# Patient Record
Sex: FEMALE | Race: WHITE | NOT HISPANIC OR LATINO | Employment: FULL TIME | URBAN - METROPOLITAN AREA
[De-identification: names, ages, dates, MRNs, and addresses within clinical notes are randomized per-mention and may not be internally consistent; named-entity substitution may affect disease eponyms.]

---

## 2017-12-08 ENCOUNTER — CONVERSION ENCOUNTER (OUTPATIENT)
Dept: MAMMOGRAPHY | Facility: HOSPITAL | Age: 47
End: 2017-12-08

## 2018-12-11 ENCOUNTER — CONVERSION ENCOUNTER (OUTPATIENT)
Dept: OTHER | Facility: HOSPITAL | Age: 48
End: 2018-12-11

## 2019-01-03 ENCOUNTER — HOSPITAL ENCOUNTER (OUTPATIENT)
Dept: OTHER | Facility: HOSPITAL | Age: 49
Discharge: HOME | End: 2019-01-03
Attending: NURSE PRACTITIONER

## 2020-02-14 ENCOUNTER — OFFICE VISIT CONVERTED (OUTPATIENT)
Dept: PODIATRY | Facility: CLINIC | Age: 50
End: 2020-02-14
Attending: PODIATRIST

## 2020-02-28 ENCOUNTER — OFFICE VISIT CONVERTED (OUTPATIENT)
Dept: PODIATRY | Facility: CLINIC | Age: 50
End: 2020-02-28
Attending: PODIATRIST

## 2021-04-26 ENCOUNTER — HOSPITAL ENCOUNTER (OUTPATIENT)
Dept: OTHER | Facility: HOSPITAL | Age: 51
Discharge: HOME | End: 2021-04-26
Attending: FAMILY MEDICINE

## 2021-04-28 ENCOUNTER — HOSPITAL ENCOUNTER (OUTPATIENT)
Dept: OTHER | Facility: HOSPITAL | Age: 51
Discharge: HOME | End: 2021-04-28
Attending: FAMILY MEDICINE

## 2021-05-12 ENCOUNTER — HOSPITAL ENCOUNTER (OUTPATIENT)
Dept: GENERAL RADIOLOGY | Facility: HOSPITAL | Age: 51
Discharge: HOME | End: 2021-05-12
Attending: FAMILY MEDICINE

## 2021-05-15 VITALS
HEART RATE: 75 BPM | BODY MASS INDEX: 36.37 KG/M2 | HEIGHT: 68 IN | DIASTOLIC BLOOD PRESSURE: 76 MMHG | OXYGEN SATURATION: 98 % | SYSTOLIC BLOOD PRESSURE: 125 MMHG | WEIGHT: 240 LBS

## 2021-05-15 VITALS
WEIGHT: 242 LBS | DIASTOLIC BLOOD PRESSURE: 72 MMHG | HEART RATE: 70 BPM | SYSTOLIC BLOOD PRESSURE: 120 MMHG | HEIGHT: 68 IN | BODY MASS INDEX: 36.68 KG/M2 | OXYGEN SATURATION: 97 %

## 2022-01-31 ENCOUNTER — PREP FOR SURGERY (OUTPATIENT)
Dept: OTHER | Facility: HOSPITAL | Age: 52
End: 2022-01-31

## 2022-01-31 ENCOUNTER — OFFICE VISIT (OUTPATIENT)
Dept: SURGERY | Facility: CLINIC | Age: 52
End: 2022-01-31

## 2022-01-31 VITALS — HEIGHT: 68 IN | BODY MASS INDEX: 34.1 KG/M2 | RESPIRATION RATE: 17 BRPM | HEART RATE: 70 BPM | WEIGHT: 225 LBS

## 2022-01-31 DIAGNOSIS — Z12.11 SCREENING FOR MALIGNANT NEOPLASM OF COLON: Primary | ICD-10-CM

## 2022-01-31 PROCEDURE — S0285 CNSLT BEFORE SCREEN COLONOSC: HCPCS | Performed by: NURSE PRACTITIONER

## 2022-01-31 RX ORDER — VENLAFAXINE HYDROCHLORIDE 37.5 MG/1
CAPSULE, EXTENDED RELEASE ORAL
Status: ON HOLD | COMMUNITY
End: 2022-04-11

## 2022-01-31 RX ORDER — POLYETHYLENE GLYCOL 3350 17 G/17G
POWDER, FOR SOLUTION ORAL
Qty: 238 PACKET | Refills: 0 | Status: SHIPPED | OUTPATIENT
Start: 2022-01-31 | End: 2022-04-11 | Stop reason: HOSPADM

## 2022-01-31 RX ORDER — HYDROCODONE BITARTRATE AND ACETAMINOPHEN 5; 325 MG/1; MG/1
1 TABLET ORAL EVERY 6 HOURS PRN
Status: ON HOLD | COMMUNITY
Start: 2021-11-29 | End: 2022-04-11

## 2022-01-31 RX ORDER — SODIUM CHLORIDE 0.9 % (FLUSH) 0.9 %
10 SYRINGE (ML) INJECTION AS NEEDED
Status: CANCELLED | OUTPATIENT
Start: 2022-01-31

## 2022-01-31 RX ORDER — DEXTROAMPHETAMINE SACCHARATE, AMPHETAMINE ASPARTATE, DEXTROAMPHETAMINE SULFATE AND AMPHETAMINE SULFATE 2.5; 2.5; 2.5; 2.5 MG/1; MG/1; MG/1; MG/1
TABLET ORAL
COMMUNITY
Start: 2022-01-20

## 2022-01-31 RX ORDER — LEVOTHYROXINE SODIUM 0.05 MG/1
TABLET ORAL
COMMUNITY

## 2022-01-31 RX ORDER — SODIUM CHLORIDE 0.9 % (FLUSH) 0.9 %
3 SYRINGE (ML) INJECTION EVERY 12 HOURS SCHEDULED
Status: CANCELLED | OUTPATIENT
Start: 2022-01-31

## 2022-01-31 RX ORDER — PHENTERMINE HYDROCHLORIDE 37.5 MG/1
TABLET ORAL
COMMUNITY

## 2022-01-31 NOTE — PROGRESS NOTES
"Chief Complaint: Colonoscopy (consult)    Subjective      Colonoscopy consultation         History of Present Illness  Meme Dunne is a 51 y.o. female presents to [facility] GENERAL SURGERY for a colonoscopy consultation.    Patient presents today on referral from Dr. Danny Pendleton.    Patient presents without complaints for screening colonoscopy.    Patient denies any abdominal pain, change in bowel habit, rectal bleeding.    Denies any family history of colorectal cancer.    Reports last colonoscopy was several years ago and was normal.     Patient has a history of an anal fissurectomy performed by Dr. Pendleton.       Objective     History reviewed. No pertinent past medical history.    Past Surgical History:   Procedure Laterality Date   • ANAL FISSURECTOMY     • APPENDECTOMY     • CHOLECYSTECTOMY     • HYSTERECTOMY  1999       Outpatient Medications Marked as Taking for the 1/31/22 encounter (Office Visit) with Rona Slaughter, MELODIE   Medication Sig Dispense Refill   • amphetamine-dextroamphetamine (ADDERALL) 10 MG tablet          No Known Allergies     Family History   Problem Relation Age of Onset   • Hypertension Father        Social History     Socioeconomic History   • Marital status:    Tobacco Use   • Smoking status: Never Smoker   • Smokeless tobacco: Never Used   Vaping Use   • Vaping Use: Never used   Substance and Sexual Activity   • Alcohol use: Never   • Drug use: Never       Review of Systems   Constitutional: Negative for chills and fever.   Gastrointestinal: Negative for abdominal distention, abdominal pain, anal bleeding, blood in stool, constipation, diarrhea and rectal pain.        Vital Signs:   Pulse 70   Resp 17   Ht 172.7 cm (68\")   Wt 102 kg (225 lb)   BMI 34.21 kg/m²      Physical Exam  Constitutional:       Appearance: Normal appearance.   HENT:      Head: Normocephalic.   Cardiovascular:      Rate and Rhythm: Normal rate.   Pulmonary:      Effort: Pulmonary " effort is normal.   Abdominal:      General: Abdomen is flat.      Palpations: Abdomen is soft.   Skin:     General: Skin is warm and dry.   Neurological:      General: No focal deficit present.      Mental Status: She is alert and oriented to person, place, and time.   Psychiatric:         Mood and Affect: Mood normal.         Thought Content: Thought content normal.          Result Review :              []  Laboratory  []  Radiology  []  Pathology  []  Microbiology  []  EKG/Telemetry   []  Cardiology/Vascular   [x]  Old records  Today I have reviewed reviewed Dr. Lynch's previous office note.     Assessment and Plan    Diagnoses and all orders for this visit:    1. Screening for malignant neoplasm of colon (Primary)    Other orders  -     polyethylene glycol (MIRALAX) 17 g packet; Take as directed.  Instructions given in office.  Dispense: 238 g bottle  Dispense: 238 packet; Refill: 0    orange prep    Follow Up   Return for Schedule colonoscopy with Dr. Luna on 4/11/2022 at [facility].     Hospital arrival time 9:30 AM    Possible risks/complications, benefits, and alternatives to surgical or invasive procedure have been explained to patient and/or legal guardian.     Patient has been evaluated and can tolerate anesthesia and/or sedation. Risks, benefits, and alternatives to anesthesia and sedation have been explained to patient and/or legal guardian.  Patient verbalizes understanding and is willing to proceed with the above plan.    Patient was given instructions and counseling regarding her condition or for health maintenance advice. Please see specific information pulled into the AVS if appropriate.

## 2022-04-11 ENCOUNTER — ANESTHESIA EVENT (OUTPATIENT)
Dept: GASTROENTEROLOGY | Facility: HOSPITAL | Age: 52
End: 2022-04-11

## 2022-04-11 ENCOUNTER — HOSPITAL ENCOUNTER (OUTPATIENT)
Facility: HOSPITAL | Age: 52
Setting detail: HOSPITAL OUTPATIENT SURGERY
Discharge: HOME | End: 2022-04-11
Attending: SURGERY | Admitting: SURGERY

## 2022-04-11 ENCOUNTER — ANESTHESIA (OUTPATIENT)
Dept: GASTROENTEROLOGY | Facility: HOSPITAL | Age: 52
End: 2022-04-11

## 2022-04-11 VITALS
SYSTOLIC BLOOD PRESSURE: 126 MMHG | TEMPERATURE: 97.5 F | RESPIRATION RATE: 14 BRPM | WEIGHT: 222.44 LBS | BODY MASS INDEX: 33.82 KG/M2 | DIASTOLIC BLOOD PRESSURE: 89 MMHG | HEART RATE: 65 BPM | OXYGEN SATURATION: 95 %

## 2022-04-11 DIAGNOSIS — Z12.11 SCREENING FOR MALIGNANT NEOPLASM OF COLON: ICD-10-CM

## 2022-04-11 PROCEDURE — 25010000002 PROPOFOL 10 MG/ML EMULSION: Performed by: NURSE ANESTHETIST, CERTIFIED REGISTERED

## 2022-04-11 RX ORDER — SODIUM CHLORIDE 0.9 % (FLUSH) 0.9 %
3 SYRINGE (ML) INJECTION EVERY 12 HOURS SCHEDULED
Status: DISCONTINUED | OUTPATIENT
Start: 2022-04-11 | End: 2022-04-11 | Stop reason: HOSPADM

## 2022-04-11 RX ORDER — ONDANSETRON 2 MG/ML
4 INJECTION INTRAMUSCULAR; INTRAVENOUS ONCE AS NEEDED
Status: DISCONTINUED | OUTPATIENT
Start: 2022-04-11 | End: 2022-04-11 | Stop reason: HOSPADM

## 2022-04-11 RX ORDER — LIDOCAINE HYDROCHLORIDE 20 MG/ML
INJECTION, SOLUTION INFILTRATION; PERINEURAL AS NEEDED
Status: DISCONTINUED | OUTPATIENT
Start: 2022-04-11 | End: 2022-04-11 | Stop reason: SURG

## 2022-04-11 RX ORDER — SODIUM CHLORIDE 0.9 % (FLUSH) 0.9 %
10 SYRINGE (ML) INJECTION AS NEEDED
Status: DISCONTINUED | OUTPATIENT
Start: 2022-04-11 | End: 2022-04-11 | Stop reason: HOSPADM

## 2022-04-11 RX ORDER — SODIUM CHLORIDE, SODIUM LACTATE, POTASSIUM CHLORIDE, CALCIUM CHLORIDE 600; 310; 30; 20 MG/100ML; MG/100ML; MG/100ML; MG/100ML
30 INJECTION, SOLUTION INTRAVENOUS CONTINUOUS
Status: DISCONTINUED | OUTPATIENT
Start: 2022-04-11 | End: 2022-04-11 | Stop reason: HOSPADM

## 2022-04-11 RX ORDER — ONDANSETRON 4 MG/1
4 TABLET, FILM COATED ORAL ONCE AS NEEDED
Status: DISCONTINUED | OUTPATIENT
Start: 2022-04-11 | End: 2022-04-11 | Stop reason: HOSPADM

## 2022-04-11 RX ORDER — PROPOFOL 10 MG/ML
VIAL (ML) INTRAVENOUS AS NEEDED
Status: DISCONTINUED | OUTPATIENT
Start: 2022-04-11 | End: 2022-04-11 | Stop reason: SURG

## 2022-04-11 RX ADMIN — SODIUM CHLORIDE, POTASSIUM CHLORIDE, SODIUM LACTATE AND CALCIUM CHLORIDE 30 ML/HR: 600; 310; 30; 20 INJECTION, SOLUTION INTRAVENOUS at 10:48

## 2022-04-11 RX ADMIN — PROPOFOL 100 MG: 10 INJECTION, EMULSION INTRAVENOUS at 11:19

## 2022-04-11 RX ADMIN — LIDOCAINE HYDROCHLORIDE 30 MG: 20 INJECTION, SOLUTION INFILTRATION; PERINEURAL at 11:19

## 2022-04-11 RX ADMIN — PROPOFOL 160 MCG/KG/MIN: 10 INJECTION, EMULSION INTRAVENOUS at 11:19

## 2022-04-11 NOTE — ANESTHESIA PREPROCEDURE EVALUATION
Anesthesia Evaluation     Patient summary reviewed and Nursing notes reviewed   no history of anesthetic complications:  NPO Solid Status: > 8 hours  NPO Liquid Status: > 2 hours           Airway   Mallampati: II  TM distance: >3 FB  No difficulty expected  Dental - normal exam     Pulmonary - negative pulmonary ROS and normal exam   Cardiovascular - negative cardio ROS and normal exam  Exercise tolerance: good (4-7 METS)        Neuro/Psych  (+) psychiatric history Depression,    GI/Hepatic/Renal/Endo    (+) obesity,       Musculoskeletal (-) negative ROS    Abdominal  - normal exam   Substance History - negative use     OB/GYN negative ob/gyn ROS         Other - negative ROS                       Anesthesia Plan    ASA 2     general   (Total IV Anesthesia    Patient understands anesthesia not responsible for dental damage.  )  intravenous induction     Anesthetic plan, all risks, benefits, and alternatives have been provided, discussed and informed consent has been obtained with: patient.    Plan discussed with CRNA.        CODE STATUS:

## 2022-04-11 NOTE — ANESTHESIA POSTPROCEDURE EVALUATION
Patient: Meme Dunne    Procedure Summary     Date: 04/11/22 Room / Location: [facility] ENDOSCOPY 3 / [facility] ENDOSCOPY    Anesthesia Start: 1117 Anesthesia Stop: 1137    Procedure: COLONOSCOPY (N/A ) Diagnosis:       Screening for malignant neoplasm of colon      (Screening for malignant neoplasm of colon [Z12.11])    Surgeons: William Luna MD Provider: Mayuri Buenrostro DO    Anesthesia Type: general ASA Status: 2          Anesthesia Type: general    Vitals  Vitals Value Taken Time   /89 04/11/22 1157   Temp 36.4 °C (97.5 °F) 04/11/22 1157   Pulse 65 04/11/22 1157   Resp 14 04/11/22 1157   SpO2 95 % 04/11/22 1157           Post Anesthesia Care and Evaluation    Patient location during evaluation: bedside  Patient participation: complete - patient participated  Level of consciousness: awake  Pain management: adequate  Airway patency: patent  Anesthetic complications: No anesthetic complications  PONV Status: none  Cardiovascular status: acceptable and stable  Respiratory status: acceptable  Hydration status: acceptable    Comments: An Anesthesiologist personally participated in the most demanding procedures (including induction and emergence if applicable) in the anesthesia plan, monitored the course of anesthesia administration at frequent intervals and remained physically present and available for immediate diagnosis and treatment of emergencies.

## 2022-04-11 NOTE — H&P
[facility]   HISTORY AND PHYSICAL    Patient Name: Meme Dunne  : 1970  MRN: [id number]  Primary Care Physician:  Woody Lynch MD  Date of admission: 2022    Subjective   Subjective     Chief Complaint: Colon screening    HPI:    Meme Dunne is a 51 y.o. female who presents for colon screening.    Review of Systems   Respiratory: Negative for shortness of breath.    Cardiovascular: Negative for chest pain.       Personal History     No past medical history on file.    Past Surgical History:   Procedure Laterality Date   • ANAL FISSURECTOMY     • APPENDECTOMY     • CHOLECYSTECTOMY     • HYSTERECTOMY         Family History: family history includes Hypertension in her father. Otherwise pertinent FHx was reviewed and not pertinent to current issue.    Social History:  reports that she has never smoked. She has never used smokeless tobacco. She reports that she does not drink alcohol and does not use drugs.    Home Medications:  HYDROcodone-acetaminophen, amphetamine-dextroamphetamine, levothyroxine, phentermine, polyethylene glycol, and venlafaxine XR    Allergies:  No Known Allergies    Objective    Objective     Vitals:   Temp:  [97.7 °F (36.5 °C)] 97.7 °F (36.5 °C)  Heart Rate:  [65] 65  Resp:  [16] 16  BP: (133)/(89) 133/89    Physical Exam  HENT:      Head: Normocephalic.   Cardiovascular:      Rate and Rhythm: Normal rate.   Pulmonary:      Effort: Pulmonary effort is normal.   Abdominal:      Palpations: Abdomen is soft.   Musculoskeletal:         General: Normal range of motion.      Cervical back: Normal range of motion.   Skin:     General: Skin is warm.   Neurological:      General: No focal deficit present.      Mental Status: She is alert.   Psychiatric:         Mood and Affect: Mood normal.         Result Review    Result Review:  I have personally reviewed the results from the time of this admission to 2022 10:27 EDT and agree with these findings:  []   Laboratory  []  Microbiology  []  Radiology  []  EKG/Telemetry   []  Cardiology/Vascular   []  Pathology  []  Old records  []  Other:  Most notable findings include:     Assessment/Plan   Assessment / Plan     Brief Patient Summary:  Meme Dunne is a 51 y.o. female who presents for colon screening    Active Hospital Problems:  Active Hospital Problems    Diagnosis    • **Screening for malignant neoplasm of colon      Added automatically from request for surgery [id number]         Plan:   We will proceed with a colonoscopy.  Risk benefits and alternatives were explained.    DVT prophylaxis:  No DVT prophylaxis order currently exists.    CODE STATUS:         Admission Status:  I believe this patient meets outpatient status.    Electronically signed by William Luna MD, 04/11/22, 10:27 AM EDT.

## 2023-09-29 ENCOUNTER — TRANSCRIBE ORDERS (OUTPATIENT)
Dept: ADMINISTRATIVE | Facility: HOSPITAL | Age: 53
End: 2023-09-29
Payer: COMMERCIAL

## 2023-09-29 DIAGNOSIS — M79.601 RIGHT ARM PAIN: ICD-10-CM

## 2023-09-29 DIAGNOSIS — Z12.31 VISIT FOR SCREENING MAMMOGRAM: Primary | ICD-10-CM

## 2023-10-20 ENCOUNTER — HOSPITAL ENCOUNTER (OUTPATIENT)
Dept: MRI IMAGING | Facility: HOSPITAL | Age: 53
Discharge: HOME | End: 2023-10-20
Admitting: NURSE PRACTITIONER
Payer: COMMERCIAL

## 2023-10-20 DIAGNOSIS — M79.601 RIGHT ARM PAIN: ICD-10-CM

## 2023-10-20 PROCEDURE — 72141 MRI NECK SPINE W/O DYE: CPT

## 2023-11-08 ENCOUNTER — HOSPITAL ENCOUNTER (OUTPATIENT)
Dept: MAMMOGRAPHY | Facility: HOSPITAL | Age: 53
Discharge: HOME | End: 2023-11-08
Admitting: NURSE PRACTITIONER
Payer: COMMERCIAL

## 2023-11-08 DIAGNOSIS — Z12.31 VISIT FOR SCREENING MAMMOGRAM: ICD-10-CM

## 2023-11-08 PROCEDURE — 77063 BREAST TOMOSYNTHESIS BI: CPT

## 2023-11-08 PROCEDURE — 77067 SCR MAMMO BI INCL CAD: CPT

## 2023-11-14 ENCOUNTER — OFFICE VISIT (OUTPATIENT)
Dept: NEUROSURGERY | Facility: CLINIC | Age: 53
End: 2023-11-14
Payer: COMMERCIAL

## 2023-11-14 VITALS
HEART RATE: 84 BPM | WEIGHT: 220 LBS | DIASTOLIC BLOOD PRESSURE: 94 MMHG | SYSTOLIC BLOOD PRESSURE: 137 MMHG | HEIGHT: 68 IN | BODY MASS INDEX: 33.34 KG/M2

## 2023-11-14 DIAGNOSIS — M25.511 PAIN IN JOINT OF RIGHT SHOULDER: ICD-10-CM

## 2023-11-14 DIAGNOSIS — M47.812 CERVICAL SPONDYLOSIS WITHOUT MYELOPATHY: Primary | ICD-10-CM

## 2023-11-14 PROCEDURE — 99215 OFFICE O/P EST HI 40 MIN: CPT | Performed by: NURSE PRACTITIONER

## 2023-11-14 NOTE — PROGRESS NOTES
"Chief Complaint  Neck Pain (Pt has no neck pain, pain only that radiates into the right shoulder and down to hand)    Subjective          Meme Dunne who is a 53 y.o. year old female who presents to [facility] for evaluation of cervical spine.       The patient complains of pain located in the cervical spine.  Patients states the pain has been present for 6 months.  The pain came on gradually.  Pain starting in the right shoulder, with pain into the arm. She saw her PCP, who ordered a steroid pack which provided her benefit. Pain returned and her PCP ordered a MRI Cervical Spine. She was then started on Diclofenac which is not providing much relief. The pain scale level is 10.  The pain does radiate. Dermatomes are located on right Cervical at: to the elbow, will occasionally radiate into the palm of the hand and 5th finger occasionally..  The pain is waxing/waning and described as aching.  The pain is worse at no particular time of day. Patient states lifting and bring makes the pain worse.  Patient states  guarding the arm  makes the pain better.    Associated Symptoms Include: Denies numbness and tingling  Conservative Interventions Include: Oral Steroids that were effective. This does not last. Diclofenac provides modest benefit. She saw chiropractic which did not help.     Was this the result of an injury or accident? : No    History of Previous Spinal Surgery?: No    Nicotine use: non-smoker    BMI: Body mass index is 33.45 kg/m².      Review of Systems   Musculoskeletal:  Positive for myalgias.   Neurological:  Positive for numbness (tingling).   All other systems reviewed and are negative.       Objective   Vital Signs:   /94 (BP Location: Left arm, Patient Position: Sitting, Cuff Size: Adult)   Pulse 84   Ht 172.7 cm (68\")   Wt 99.8 kg (220 lb)   BMI 33.45 kg/m²       Physical Exam  Vitals reviewed.   Constitutional:       Appearance: Normal " appearance.   Musculoskeletal:      Right shoulder: Tenderness present. Decreased range of motion.      Left shoulder: No tenderness. Normal range of motion.      Cervical back: No tenderness. No pain with movement. Normal range of motion.   Neurological:      Mental Status: She is alert and oriented to person, place, and time.      Motor: Motor strength is normal.     Gait: Gait is intact.      Deep Tendon Reflexes:      Reflex Scores:       Tricep reflexes are 2+ on the right side and 2+ on the left side.       Bicep reflexes are 2+ on the right side and 2+ on the left side.       Brachioradialis reflexes are 2+ on the right side and 2+ on the left side.       Neurologic Exam     Mental Status   Oriented to person, place, and time.   Level of consciousness: alert    Motor Exam   Muscle bulk: normal  Overall muscle tone: normal    Strength   Strength 5/5 throughout.     Sensory Exam   Light touch normal.     Gait, Coordination, and Reflexes     Gait  Gait: normal    Reflexes   Right brachioradialis: 2+  Left brachioradialis: 2+  Right biceps: 2+  Left biceps: 2+  Right triceps: 2+  Left triceps: 2+  Right Martin: absent  Left Martin: absent       Result Review :       Data reviewed : Radiologic studies MRI Cervical Spine on 10/20/23 at [facility] personally reviewed. Degenerative changes, most significant at C4/5 and C5/6 where there is moderate spinal stenosis and moderate severe foraminal stenosis.            Assessment and Plan    Diagnoses and all orders for this visit:    1. Cervical spondylosis without myelopathy (Primary)  -     Ambulatory Referral to Physical Therapy Evaluate and treat; Heat, Electrotherapy; Moist heat; Tens (Home); Cross Fiber; Stretching, ROM, Strengthening    2. Pain in joint of right shoulder  -     Ambulatory Referral to Physical Therapy Evaluate and treat; Heat, Electrotherapy; Moist heat; Tens (Home); Cross Fiber; Stretching, ROM, Strengthening    Pt presenting for evaluation of right  shoulder and arm pain. We reviewed her MRI Cervical Spine, demonstrating spondylosis at C4/5 and C5/6 with spinal stenosis. Her exam demonstrates no pain in the neck. She is having limited range of motion in the shoulder, with pain in the axilla. I suspect her symptoms are coming more from the shoulder. Will refer to physical therapy neck and shoulder. I believe she would benefit from dry needling. If symptoms progress or worsen, would consider sports medicine for shoulder injection.     She will follow up in our office as needed.     I spent 40 minutes caring for Meme on this date of service. This time includes time spent by me in the following activities:preparing for the visit, reviewing tests, obtaining and/or reviewing a separately obtained history, performing a medically appropriate examination and/or evaluation , counseling and educating the patient/family/caregiver, referring and communicating with other health care professionals , documenting information in the medical record, and independently interpreting results and communicating that information with the patient/family/caregiver.    Follow Up   Return if symptoms worsen or fail to improve.  Patient was given instructions and counseling regarding her condition or for health maintenance advice.     -Refer to physical therapy for neck and shoulder  -Follow up as needed

## 2024-07-02 ENCOUNTER — OFFICE VISIT (OUTPATIENT)
Dept: PODIATRY | Facility: CLINIC | Age: 54
End: 2024-07-02
Payer: COMMERCIAL

## 2024-07-02 VITALS
DIASTOLIC BLOOD PRESSURE: 82 MMHG | TEMPERATURE: 98 F | HEIGHT: 68 IN | WEIGHT: 192 LBS | HEART RATE: 57 BPM | SYSTOLIC BLOOD PRESSURE: 122 MMHG | OXYGEN SATURATION: 98 % | BODY MASS INDEX: 29.1 KG/M2

## 2024-07-02 DIAGNOSIS — M79.672 FOOT PAIN, LEFT: ICD-10-CM

## 2024-07-02 DIAGNOSIS — M79.671 FOOT PAIN, RIGHT: ICD-10-CM

## 2024-07-02 DIAGNOSIS — M20.5X1 HALLUX LIMITUS OF RIGHT FOOT: ICD-10-CM

## 2024-07-02 DIAGNOSIS — M20.5X2 HALLUX LIMITUS OF LEFT FOOT: Primary | ICD-10-CM

## 2024-07-02 PROCEDURE — 99204 OFFICE O/P NEW MOD 45 MIN: CPT | Performed by: PODIATRIST

## 2024-07-02 RX ORDER — DEXTROAMPHETAMINE SACCHARATE, AMPHETAMINE ASPARTATE, DEXTROAMPHETAMINE SULFATE AND AMPHETAMINE SULFATE 3.75; 3.75; 3.75; 3.75 MG/1; MG/1; MG/1; MG/1
1 TABLET ORAL 2 TIMES DAILY
COMMUNITY

## 2024-07-02 NOTE — PROGRESS NOTES
[facility] - PODIATRY    Today's Date: 07/02/24    Patient Name: Meme Dunne  MRN: [id number]  CSN: [id number]  PCP: Woody Lynch MD  Referring Provider: Referring, Self    SUBJECTIVE     Chief Complaint   Patient presents with    Left Foot - Establish Care, Toe Pain     Great toe pain     Right Foot - Establish Care, Toe Pain     Great toe pain   Last seen 2/2020     HPI: Meme Dunne, a 53 y.o.female, presents to clinic.    New, Established, New Problem:  new    Location:  1st MPJ b/l pain; Left > Right    Duration:  > one year    Onset:  insidious    Nature:  sore    Stable, worsening, improving:  worsening    Aggravating factors:  Patient relates pain is aggravated by shoe gear and ambulation.      Previous Treatment:  previous surgery on Right 1st MPJ    Patient denies any fevers, chills, nausea, vomiting, shortness of breath, nor any other constitutional signs nor symptoms.    Past Medical History:   Diagnosis Date    Great toe pain, left     Great toe pain, right      Past Surgical History:   Procedure Laterality Date    ANAL FISSURECTOMY      APPENDECTOMY      CHOLECYSTECTOMY      COLONOSCOPY N/A 04/11/2022    Procedure: COLONOSCOPY;  Surgeon: William Luna MD;  Location: [facility] ENDOSCOPY;  Service: General;  Laterality: N/A;  NORMAL COLON    FOOT SURGERY Right 2020    HYSTERECTOMY  1999     Family History   Problem Relation Age of Onset    Hypertension Father      Social History     Socioeconomic History    Marital status:    Tobacco Use    Smoking status: Never    Smokeless tobacco: Never   Vaping Use    Vaping status: Never Used   Substance and Sexual Activity    Alcohol use: Never    Drug use: Never    Sexual activity: Defer     No Known Allergies  Current Outpatient Medications   Medication Sig Dispense Refill    amphetamine-dextroamphetamine (ADDERALL) 15 MG tablet Take 1 tablet by mouth 2 (Two) Times a Day.       No current facility-administered medications  "for this visit.     Review of Systems   Constitutional: Negative.    Musculoskeletal:         1st MPJ b/l   All other systems reviewed and are negative.      OBJECTIVE     Vitals:    07/02/24 1007   BP: 122/82   Pulse: 57   Temp: 98 °F (36.7 °C)   SpO2: 98%       No results found for: \"WBC\", \"RBC\", \"HGB\", \"HCT\", \"MCV\", \"MCH\", \"MCHC\", \"RDW\", \"RDWSD\", \"MPV\", \"PLT\", \"NEUTRORELPCT\", \"LYMPHORELPCT\", \"MONORELPCT\", \"EOSRELPCT\", \"BASORELPCT\", \"AUTOIGPER\", \"NEUTROABS\", \"LYMPHSABS\", \"MONOSABS\", \"EOSABS\", \"BASOSABS\", \"AUTOIGNUM\", \"NRBC\"      No results found for: \"GLUCOSE\", \"BUN\", \"CREATININE\", \"EGFRRESULT\", \"EGFR\", \"BCR\", \"K\", \"CO2\", \"CALCIUM\", \"PROTENTOTREF\", \"ALBUMIN\", \"BILITOT\", \"AST\", \"ALT\"    Patient seen in no apparent distress.      PHYSICAL EXAM:     Foot/Ankle Exam    GENERAL  Appearance:  appears stated age  Orientation:  AAOx3  Affect:  appropriate  Gait:  unimpaired  Assistance:  independent  Right shoe gear: sandal  Left shoe gear: sandal    VASCULAR     Right Foot Vascularity   Normal vascular exam    Dorsalis pedis:  2+  Posterior tibial:  2+  Skin temperature:  warm  Edema grading:  None  CFT:  < 3 seconds  Pedal hair growth:  Present  Varicosities:  none     Left Foot Vascularity   Normal vascular exam    Dorsalis pedis:  2+  Posterior tibial:  2+  Skin temperature:  warm  Edema grading:  None  CFT:  < 3 seconds  Pedal hair growth:  Present  Varicosities:  none     NEUROLOGIC     Right Foot Neurologic   Normal sensation    Light touch sensation: normal  Vibratory sensation: normal  Hot/Cold sensation: normal  Protective Sensation using [place] Monofilament:   Sites intact: 10  Sites tested: 10     Left Foot Neurologic   Normal sensation    Light touch sensation: normal  Vibratory sensation: normal  Hot/Cold sensation:  normal  Protective Sensation using [place] Monofilament:   Sites intact: 10  Sites tested: 10    MUSCULOSKELETAL     Right Foot Musculoskeletal   Tenderness:  MTP 1 dorsal " tenderness    Hallux limitus: Yes       Left Foot Musculoskeletal   Tenderness:  MTP 1 dorsal tenderness  Hallux limitus: Yes      MUSCLE STRENGTH     Right Foot Muscle Strength   Foot dorsiflexion:  4  Foot plantar flexion:  4  Foot inversion:  4  Foot eversion:  4     Left Foot Muscle Strength   Foot dorsiflexion:  4  Foot plantar flexion:  4  Foot inversion:  4  Foot eversion:  4    RANGE OF MOTION     Right Foot Range of Motion   Foot and ankle ROM within normal limits       Left Foot Range of Motion   Foot and ankle ROM within normal limits      DERMATOLOGIC      Right Foot Dermatologic   Skin  Right foot skin is intact.      Left Foot Dermatologic   Skin  Left foot skin is intact.       RADIOLOGY:      XR Foot 3+ View Right    Result Date: 7/2/2024  Narrative: IN-OFFICE IMAGING:  Standing, weightbearing, 3 view, AP, MO, Lateral, Right foot Indication:  Foot Pain Findings: Significant first metatarsal degenerative changes.  Small inferior calcaneal enthesopathy.  No periosteal reactions nor osteolytic changes seen.  No occult fractures seen. Comparison: No comparison views available.     XR Foot 3+ View Left    Result Date: 7/2/2024  Narrative: IN-OFFICE IMAGING:  Standing, weightbearing, 3 view, AP, MO, Lateral, Left foot Indication:  Foot Pain Findings: Significant first metatarsal degenerative changes with dorsal exostosis and osteophytes.  Small inferior calcaneal enthesopathy.  No periosteal reactions nor osteolytic changes seen.  No occult fractures seen. Comparison: No comparison views available.     ASSESSMENT/PLAN     Diagnoses and all orders for this visit:    1. Hallux limitus of left foot (Primary)  -     Case Request; Standing  -     ceFAZolin (ANCEF) 2 g in sodium chloride 0.9 % 100 mL IVPB  -     Case Request    2. Hallux limitus of right foot    3. Foot pain, left    4. Foot pain, right    Other orders  -     Follow Anesthesia Guidelines / Protocol; Future  -     Follow Anesthesia Guidelines /  Protocol; Standing  -     Verify NPO Status; Standing  -     Obtain informed consent (if not collected inpatient or PAT); Standing  -     Obtain Informed Consent; Future        Comprehensive lower extremity examination and evaluation was performed.    Discussed findings and treatment plan including risks, benefits, and treatment options with patient in detail. Patient agreed with treatment plan.    Medications and allergies reviewed.  Reviewed available lab values along with other pertinent labs.  These were discussed with the patient.    Planned surgery: Left first MPJ fusion.    The risks and benefits of the surgery were discussed with the patient.  This discussion included possible complications of requiring further surgery, possible delayed wound healing, further surgery requiring amputation of the foot or leg, and also included the complication of death.  All the patient's questions were answered to their satisfaction.  Patient states they would like to proceed with the procedure.    Discussed with the patient at length surgical versus nonsurgical options.  Explained to the patient in detail that surgical intervention is only indicated when the level of pain is such that it negatively affects her daily life.    It was explained to the patient that surgical interventions often times do not relieve all of the pain associated with the deformity    Risks and complications associated with surgical versus nonsurgical options were explained.  The patient understands that the problem will most likely continue to evolve and surgical intervention is the only treatment plan that actually corrects the deformities.    Upon discussion of non-surgical conservative option, surgical correction, post-operative requirements along with risk and benefits of the surgery along with expected outcomes, the patient states they would like to proceed with the scheduling surgery.      The patient has decided to move forward with surgical  intervention understanding all of these risks and complications.    An After Visit Summary was printed and given to the patient at discharge, including (if requested) any available informative/educational handouts regarding diagnosis, treatment, or medications. All questions were answered to patient/family satisfaction. Should symptoms fail to improve or worsen they agree to call or return to clinic or to go to the Emergency Department. Discussed the importance of following up with any needed screening tests/labs/specialist appointments and any requested follow-up recommended by me today. Importance of maintaining follow-up discussed and patient accepts that missed appointments can delay diagnosis and potentially lead to worsening of conditions.    Return for Post Operative, X-ray needed, Cast change., or sooner if acute issues arise.    This document has been electronically signed by Meir Mcneill DPM on July 2, 2024 10:50 EDT

## 2024-07-02 NOTE — H&P (VIEW-ONLY)
[facility] - PODIATRY    Today's Date: 07/02/24    Patient Name: Meme Dunne  MRN: [id number]  CSN: [id number]  PCP: Woody Lynch MD  Referring Provider: Referring, Self    SUBJECTIVE     Chief Complaint   Patient presents with    Left Foot - Establish Care, Toe Pain     Great toe pain     Right Foot - Establish Care, Toe Pain     Great toe pain   Last seen 2/2020     HPI: Meme Dunne, a 53 y.o.female, presents to clinic.    New, Established, New Problem:  new    Location:  1st MPJ b/l pain; Left > Right    Duration:  > one year    Onset:  insidious    Nature:  sore    Stable, worsening, improving:  worsening    Aggravating factors:  Patient relates pain is aggravated by shoe gear and ambulation.      Previous Treatment:  previous surgery on Right 1st MPJ    Patient denies any fevers, chills, nausea, vomiting, shortness of breath, nor any other constitutional signs nor symptoms.    Past Medical History:   Diagnosis Date    Great toe pain, left     Great toe pain, right      Past Surgical History:   Procedure Laterality Date    ANAL FISSURECTOMY      APPENDECTOMY      CHOLECYSTECTOMY      COLONOSCOPY N/A 04/11/2022    Procedure: COLONOSCOPY;  Surgeon: William Luna MD;  Location: [facility] ENDOSCOPY;  Service: General;  Laterality: N/A;  NORMAL COLON    FOOT SURGERY Right 2020    HYSTERECTOMY  1999     Family History   Problem Relation Age of Onset    Hypertension Father      Social History     Socioeconomic History    Marital status:    Tobacco Use    Smoking status: Never    Smokeless tobacco: Never   Vaping Use    Vaping status: Never Used   Substance and Sexual Activity    Alcohol use: Never    Drug use: Never    Sexual activity: Defer     No Known Allergies  Current Outpatient Medications   Medication Sig Dispense Refill    amphetamine-dextroamphetamine (ADDERALL) 15 MG tablet Take 1 tablet by mouth 2 (Two) Times a Day.       No current facility-administered medications  "for this visit.     Review of Systems   Constitutional: Negative.    Musculoskeletal:         1st MPJ b/l   All other systems reviewed and are negative.      OBJECTIVE     Vitals:    07/02/24 1007   BP: 122/82   Pulse: 57   Temp: 98 °F (36.7 °C)   SpO2: 98%       No results found for: \"WBC\", \"RBC\", \"HGB\", \"HCT\", \"MCV\", \"MCH\", \"MCHC\", \"RDW\", \"RDWSD\", \"MPV\", \"PLT\", \"NEUTRORELPCT\", \"LYMPHORELPCT\", \"MONORELPCT\", \"EOSRELPCT\", \"BASORELPCT\", \"AUTOIGPER\", \"NEUTROABS\", \"LYMPHSABS\", \"MONOSABS\", \"EOSABS\", \"BASOSABS\", \"AUTOIGNUM\", \"NRBC\"      No results found for: \"GLUCOSE\", \"BUN\", \"CREATININE\", \"EGFRRESULT\", \"EGFR\", \"BCR\", \"K\", \"CO2\", \"CALCIUM\", \"PROTENTOTREF\", \"ALBUMIN\", \"BILITOT\", \"AST\", \"ALT\"    Patient seen in no apparent distress.      PHYSICAL EXAM:     Foot/Ankle Exam    GENERAL  Appearance:  appears stated age  Orientation:  AAOx3  Affect:  appropriate  Gait:  unimpaired  Assistance:  independent  Right shoe gear: sandal  Left shoe gear: sandal    VASCULAR     Right Foot Vascularity   Normal vascular exam    Dorsalis pedis:  2+  Posterior tibial:  2+  Skin temperature:  warm  Edema grading:  None  CFT:  < 3 seconds  Pedal hair growth:  Present  Varicosities:  none     Left Foot Vascularity   Normal vascular exam    Dorsalis pedis:  2+  Posterior tibial:  2+  Skin temperature:  warm  Edema grading:  None  CFT:  < 3 seconds  Pedal hair growth:  Present  Varicosities:  none     NEUROLOGIC     Right Foot Neurologic   Normal sensation    Light touch sensation: normal  Vibratory sensation: normal  Hot/Cold sensation: normal  Protective Sensation using Hanover-Chanell Monofilament:   Sites intact: 10  Sites tested: 10     Left Foot Neurologic   Normal sensation    Light touch sensation: normal  Vibratory sensation: normal  Hot/Cold sensation:  normal  Protective Sensation using Hanover-Chanell Monofilament:   Sites intact: 10  Sites tested: 10    MUSCULOSKELETAL     Right Foot Musculoskeletal   Tenderness:  MTP 1 dorsal " tenderness    Hallux limitus: Yes       Left Foot Musculoskeletal   Tenderness:  MTP 1 dorsal tenderness  Hallux limitus: Yes      MUSCLE STRENGTH     Right Foot Muscle Strength   Foot dorsiflexion:  4  Foot plantar flexion:  4  Foot inversion:  4  Foot eversion:  4     Left Foot Muscle Strength   Foot dorsiflexion:  4  Foot plantar flexion:  4  Foot inversion:  4  Foot eversion:  4    RANGE OF MOTION     Right Foot Range of Motion   Foot and ankle ROM within normal limits       Left Foot Range of Motion   Foot and ankle ROM within normal limits      DERMATOLOGIC      Right Foot Dermatologic   Skin  Right foot skin is intact.      Left Foot Dermatologic   Skin  Left foot skin is intact.       RADIOLOGY:      XR Foot 3+ View Right    Result Date: 7/2/2024  Narrative: IN-OFFICE IMAGING:  Standing, weightbearing, 3 view, AP, MO, Lateral, Right foot Indication:  Foot Pain Findings: Significant first metatarsal degenerative changes.  Small inferior calcaneal enthesopathy.  No periosteal reactions nor osteolytic changes seen.  No occult fractures seen. Comparison: No comparison views available.     XR Foot 3+ View Left    Result Date: 7/2/2024  Narrative: IN-OFFICE IMAGING:  Standing, weightbearing, 3 view, AP, MO, Lateral, Left foot Indication:  Foot Pain Findings: Significant first metatarsal degenerative changes with dorsal exostosis and osteophytes.  Small inferior calcaneal enthesopathy.  No periosteal reactions nor osteolytic changes seen.  No occult fractures seen. Comparison: No comparison views available.     ASSESSMENT/PLAN     Diagnoses and all orders for this visit:    1. Hallux limitus of left foot (Primary)  -     Case Request; Standing  -     ceFAZolin (ANCEF) 2 g in sodium chloride 0.9 % 100 mL IVPB  -     Case Request    2. Hallux limitus of right foot    3. Foot pain, left    4. Foot pain, right    Other orders  -     Follow Anesthesia Guidelines / Protocol; Future  -     Follow Anesthesia Guidelines /  Protocol; Standing  -     Verify NPO Status; Standing  -     Obtain informed consent (if not collected inpatient or PAT); Standing  -     Obtain Informed Consent; Future        Comprehensive lower extremity examination and evaluation was performed.    Discussed findings and treatment plan including risks, benefits, and treatment options with patient in detail. Patient agreed with treatment plan.    Medications and allergies reviewed.  Reviewed available lab values along with other pertinent labs.  These were discussed with the patient.    Planned surgery: Left first MPJ fusion.    The risks and benefits of the surgery were discussed with the patient.  This discussion included possible complications of requiring further surgery, possible delayed wound healing, further surgery requiring amputation of the foot or leg, and also included the complication of death.  All the patient's questions were answered to their satisfaction.  Patient states they would like to proceed with the procedure.    Discussed with the patient at length surgical versus nonsurgical options.  Explained to the patient in detail that surgical intervention is only indicated when the level of pain is such that it negatively affects her daily life.    It was explained to the patient that surgical interventions often times do not relieve all of the pain associated with the deformity    Risks and complications associated with surgical versus nonsurgical options were explained.  The patient understands that the problem will most likely continue to evolve and surgical intervention is the only treatment plan that actually corrects the deformities.    Upon discussion of non-surgical conservative option, surgical correction, post-operative requirements along with risk and benefits of the surgery along with expected outcomes, the patient states they would like to proceed with the scheduling surgery.      The patient has decided to move forward with surgical  intervention understanding all of these risks and complications.    An After Visit Summary was printed and given to the patient at discharge, including (if requested) any available informative/educational handouts regarding diagnosis, treatment, or medications. All questions were answered to patient/family satisfaction. Should symptoms fail to improve or worsen they agree to call or return to clinic or to go to the Emergency Department. Discussed the importance of following up with any needed screening tests/labs/specialist appointments and any requested follow-up recommended by me today. Importance of maintaining follow-up discussed and patient accepts that missed appointments can delay diagnosis and potentially lead to worsening of conditions.    Return for Post Operative, X-ray needed, Cast change., or sooner if acute issues arise.    This document has been electronically signed by Meir Mcneill DPM on July 2, 2024 10:50 EDT

## 2024-07-23 ENCOUNTER — TRANSCRIBE ORDERS (OUTPATIENT)
Dept: ADMINISTRATIVE | Facility: HOSPITAL | Age: 54
End: 2024-07-23
Payer: COMMERCIAL

## 2024-07-23 DIAGNOSIS — Z12.31 SCREENING MAMMOGRAM FOR BREAST CANCER: Primary | ICD-10-CM

## 2024-07-25 ENCOUNTER — ANESTHESIA EVENT (OUTPATIENT)
Dept: PERIOP | Facility: HOSPITAL | Age: 54
End: 2024-07-25
Payer: COMMERCIAL

## 2024-07-25 NOTE — PRE-PROCEDURE INSTRUCTIONS
PATIENT INSTRUCTED TO BE:    - NOTHING TO EAT AFTER MIDNIGHT OR CHEW, EXCEPT CAN HAVE CLEAR LIQUIDS 2 HOURS PRIOR TO SURGERY ARRIVAL TIME , NO MORE THAN 8 OZ. (NOTHING RED)     - TO HOLD ALL VITAMINS, SUPPLEMENTS, NSAIDS FOR ONE WEEK PRIOR TO THEIR SURGICAL PROCEDURE         - BATHING INSTRUCTIONS GIVEN    INSTRUCTED NO LOTIONS, JEWELRY, PIERCINGS,  NAIL POLISH, OR DEODORANT DAY OF SURGERY        -INSTRUCTED TO TAKE THE FOLLOWING MEDICATIONS THE DAY OF SURGERY WITH SIPS OF WATER:   none        - DO NOT BRING ANY MEDICATIONS WITH YOU TO THE HOSPITAL THE DAY OF SURGERY, EXCEPT IF USE INHALERS. BRING INHALERS DAY OF SURGERY       - BRING CPAP OR BIPAP TO THE HOSPITAL ONLY IF YOU ARE SPENDING THE NIGHT    - DO NOT SMOKE OR VAPE 24 HOURS PRIOR TO PROCEDURE PER ANESTHESIA REQUEST     -MAKE SURE YOU HAVE A RIDE HOME OR SOMEONE TO STAY WITH YOU THE DAY OF THE PROCEDURE AFTER YOU GO HOME     - FOLLOW ANY OTHER INSTRUCTIONS GIVEN TO YOU BY YOUR SURGEON'S OFFICE.     - COME TO ELEVATOR A, THIRD FLOOR, CHECK IN AT THE DESK FOR REGISTRATION/SURGERY   - YOU WILL RECEIVE A PHONE CALL THE DAY PRIOR TO SURGERY BETWEEN 1PM AND 4 PM WITH ARRIVAL TIME, IF YOUR SURGERY IS ON A MONDAY YOU WILL RECEIVE A CALL THE FRIDAY PRIOR TO SURGERY DATE    - BRING CASH OR CREDIT CARD FOR COPAYMENT OF MEDICATIONS AFTER SURGERY IF YOU USE THE HOSPITAL PHARMACY (MEDS TO BED)    - PREADMISSION TESTING NURSE BIANKA GARCIA [phone] IF HAVE ANY QUESTIONS     -PATIENT PROVIDED THE NUMBER FOR PREOP SURGICAL DEPT IF HAD QUESTIONS AFTER HOURS PRIOR TO SURGERY ([phone] .  INFORMED PT IF NO ANSWER, LEAVE A MESSAGE AND SOMEONE WILL RETURN THEIR CALL       PATIENT VERBALIZED UNDERSTANDING       Clear Liquid Diet        Find out when you need to start a clear liquid diet.   Think of “clear liquids” as anything you could read a newspaper through. This includes things like water, broth, sports drinks, or tea WITHOUT any kind of milk or cream.           Once  you are told to start a clear liquid diet, only drink these things until 2 hours before arrival to the hospital or when the hospital says to stop. Total volume limitation: 8 oz.       Clear liquids you CAN drink:   Water   Clear broth: beef, chicken, vegetable, or bone broth with nothing in it   Gatorade   Lemonade or Joshua-aid   Soda   Tea, coffee (NO cream or honey)   Jell-O (without fruit)   Popsicles (without fruit or cream)   Italian ices   Juice without pulp: apple, white, grape   You may use salt, pepper, and sugar  NO RED  NO NOODLES    Do NOT drink:   Milk or cream   Soy milk, almond milk, coconut milk, or other non-dairy drinks and   creamers   Milkshakes or smoothies   Tomato juice   Orange juice   Grapefruit juice   Cream soups or any other than broth         Clear Liquid Diet:  Do NOT eat any solid food.  Do NOT eat or suck on mints or candy.  Do NOT chew gum.  Do NOT drink thick liquids like milk or juice with pulp in it.  Do NOT add milk, cream, or anything like soy milk or almond milk to coffee or tea.

## 2024-07-26 ENCOUNTER — HOSPITAL ENCOUNTER (OUTPATIENT)
Facility: HOSPITAL | Age: 54
Setting detail: HOSPITAL OUTPATIENT SURGERY
Discharge: HOME | End: 2024-07-26
Attending: PODIATRIST | Admitting: PODIATRIST
Payer: COMMERCIAL

## 2024-07-26 ENCOUNTER — ANESTHESIA (OUTPATIENT)
Dept: PERIOP | Facility: HOSPITAL | Age: 54
End: 2024-07-26
Payer: COMMERCIAL

## 2024-07-26 VITALS
HEIGHT: 66 IN | OXYGEN SATURATION: 100 % | HEART RATE: 70 BPM | TEMPERATURE: 98.2 F | BODY MASS INDEX: 31.07 KG/M2 | DIASTOLIC BLOOD PRESSURE: 90 MMHG | WEIGHT: 193.34 LBS | RESPIRATION RATE: 16 BRPM | SYSTOLIC BLOOD PRESSURE: 132 MMHG

## 2024-07-26 DIAGNOSIS — M20.5X2 HALLUX LIMITUS OF LEFT FOOT: ICD-10-CM

## 2024-07-26 PROCEDURE — 25810000003 LACTATED RINGERS PER 1000 ML: Performed by: ANESTHESIOLOGY

## 2024-07-26 PROCEDURE — 25010000002 CEFAZOLIN PER 500 MG: Performed by: PODIATRIST

## 2024-07-26 PROCEDURE — C1713 ANCHOR/SCREW BN/BN,TIS/BN: HCPCS | Performed by: PODIATRIST

## 2024-07-26 PROCEDURE — 25010000002 BUPIVACAINE (PF) 0.25 % SOLUTION: Performed by: PODIATRIST

## 2024-07-26 PROCEDURE — 25010000002 PROPOFOL 10 MG/ML EMULSION: Performed by: ANESTHESIOLOGY

## 2024-07-26 PROCEDURE — 25010000002 MIDAZOLAM PER 1MG: Performed by: ANESTHESIOLOGY

## 2024-07-26 PROCEDURE — 28750 FUSION OF BIG TOE JOINT: CPT

## 2024-07-26 PROCEDURE — 28750 FUSION OF BIG TOE JOINT: CPT | Performed by: PODIATRIST

## 2024-07-26 DEVICE — JAWS STAPLE, 12MM, 12 X 12 STRAIGHT
Type: IMPLANTABLE DEVICE | Site: FOOT | Status: FUNCTIONAL
Brand: JAWS STAPLE SYSTEM

## 2024-07-26 RX ORDER — HYDROCODONE BITARTRATE AND ACETAMINOPHEN 5; 325 MG/1; MG/1
1-2 TABLET ORAL EVERY 4 HOURS PRN
Qty: 30 TABLET | Refills: 0 | Status: SHIPPED | OUTPATIENT
Start: 2024-07-26

## 2024-07-26 RX ORDER — PROMETHAZINE HYDROCHLORIDE 12.5 MG/1
12.5 TABLET ORAL EVERY 8 HOURS PRN
Qty: 30 TABLET | Refills: 0 | Status: SHIPPED | OUTPATIENT
Start: 2024-07-26

## 2024-07-26 RX ORDER — ACETAMINOPHEN 500 MG
1000 TABLET ORAL ONCE
Status: COMPLETED | OUTPATIENT
Start: 2024-07-26 | End: 2024-07-26

## 2024-07-26 RX ORDER — KETAMINE HCL IN NACL, ISO-OSM 100MG/10ML
SYRINGE (ML) INJECTION AS NEEDED
Status: DISCONTINUED | OUTPATIENT
Start: 2024-07-26 | End: 2024-07-26 | Stop reason: SURG

## 2024-07-26 RX ORDER — LIDOCAINE HYDROCHLORIDE 20 MG/ML
INJECTION, SOLUTION EPIDURAL; INFILTRATION; INTRACAUDAL; PERINEURAL AS NEEDED
Status: DISCONTINUED | OUTPATIENT
Start: 2024-07-26 | End: 2024-07-26 | Stop reason: SURG

## 2024-07-26 RX ORDER — MIDAZOLAM HYDROCHLORIDE 2 MG/2ML
2 INJECTION, SOLUTION INTRAMUSCULAR; INTRAVENOUS ONCE
Status: COMPLETED | OUTPATIENT
Start: 2024-07-26 | End: 2024-07-26

## 2024-07-26 RX ORDER — PROPOFOL 10 MG/ML
VIAL (ML) INTRAVENOUS AS NEEDED
Status: DISCONTINUED | OUTPATIENT
Start: 2024-07-26 | End: 2024-07-26 | Stop reason: SURG

## 2024-07-26 RX ORDER — OXYCODONE HYDROCHLORIDE 5 MG/1
5 TABLET ORAL
Status: DISCONTINUED | OUTPATIENT
Start: 2024-07-26 | End: 2024-07-26 | Stop reason: HOSPADM

## 2024-07-26 RX ORDER — BUPIVACAINE HYDROCHLORIDE 2.5 MG/ML
INJECTION, SOLUTION EPIDURAL; INFILTRATION; INTRACAUDAL AS NEEDED
Status: DISCONTINUED | OUTPATIENT
Start: 2024-07-26 | End: 2024-07-26 | Stop reason: HOSPADM

## 2024-07-26 RX ORDER — SODIUM CHLORIDE, SODIUM LACTATE, POTASSIUM CHLORIDE, CALCIUM CHLORIDE 600; 310; 30; 20 MG/100ML; MG/100ML; MG/100ML; MG/100ML
9 INJECTION, SOLUTION INTRAVENOUS CONTINUOUS PRN
Status: DISCONTINUED | OUTPATIENT
Start: 2024-07-26 | End: 2024-07-26 | Stop reason: HOSPADM

## 2024-07-26 RX ORDER — ONDANSETRON 2 MG/ML
4 INJECTION INTRAMUSCULAR; INTRAVENOUS ONCE AS NEEDED
Status: DISCONTINUED | OUTPATIENT
Start: 2024-07-26 | End: 2024-07-26 | Stop reason: HOSPADM

## 2024-07-26 RX ADMIN — Medication 10 MG: at 13:43

## 2024-07-26 RX ADMIN — LIDOCAINE HYDROCHLORIDE 100 MG: 20 INJECTION, SOLUTION INTRAVENOUS at 13:20

## 2024-07-26 RX ADMIN — MIDAZOLAM HYDROCHLORIDE 2 MG: 1 INJECTION, SOLUTION INTRAMUSCULAR; INTRAVENOUS at 12:32

## 2024-07-26 RX ADMIN — PROPOFOL 80 MG: 10 INJECTION, EMULSION INTRAVENOUS at 13:20

## 2024-07-26 RX ADMIN — Medication 20 MG: at 13:21

## 2024-07-26 RX ADMIN — SODIUM CHLORIDE 2 G: 9 INJECTION, SOLUTION INTRAVENOUS at 13:27

## 2024-07-26 RX ADMIN — PROPOFOL 200 MCG/KG/MIN: 10 INJECTION, EMULSION INTRAVENOUS at 13:20

## 2024-07-26 RX ADMIN — PROPOFOL 30 MG: 10 INJECTION, EMULSION INTRAVENOUS at 13:43

## 2024-07-26 RX ADMIN — SODIUM CHLORIDE, POTASSIUM CHLORIDE, SODIUM LACTATE AND CALCIUM CHLORIDE 9 ML/HR: 600; 310; 30; 20 INJECTION, SOLUTION INTRAVENOUS at 11:19

## 2024-07-26 RX ADMIN — ACETAMINOPHEN 1000 MG: 500 TABLET ORAL at 11:19

## 2024-07-26 NOTE — OP NOTE
Pre-Operative Diagnosis:  Left Hallux Rigidus    Post-Operative Diagnosis:  Same as pre-op diagnosis    Surgeon  Charito    Anesthesia  [facility]    Procedure Performed/Technique  Arthrodesis of Left Hallux MPJ with Casting    Specimen/Tissue Removed:  None    Findings:    DJD in 1st MPJ    Complications:  No    Hemostasis:  Well-padded pneumatic calf tourniquet 250 mmHg x 15 minutes.    Estimated Blood Loss:  None    Procedure:  DESCRIPTION OF PROCEDURE:  Written consent was obtained from the patient prior to any medications or sedation being administered.     Under mild sedation, the patient was brought to the operating room and placed on the operating table in the supine position.  A well padded calf tourniquet was placed about the patient's left calf. Following IV anesthesia, local anesthesia was obtained around the right first ray utilizing a total of 20 mL of 0.25% Marcaine plain.  The foot was then scrubbed, prepped and draped in the usual aseptic manner.  An Esmarch bandage was utilized to exsanguinate the patient's left foot with a total pressure of 250 mmHg.     Attention was directed to the left first metatarsal phalangeal joint where a 6 cm linear and longitudinal incision was made in the dorsal medial aspect first metatarsal phalangeal joint. An incision was made parallel to the first metatarsal phalangeal joint and involved the contour of the deformity. The incision was deepened through subcutaneous tissues using sharp and blunt dissection. Care was taken to identify and retract all vital and neurovascular structures.  All bleeders were ligated and cauterized as necessary.     At this time, a linear capsulotomy was performed over the metatarsal phalangeal joint.  The periosteal and capsule structures were then carefully dissected free of their osseous attachments and reflected dorsally and plantarly, thus exposing the head of the first metatarsal site.     Next, utilizing a cup and cone metatarsal head  reamer, the contour surface of the head of the metatarsal phalangeal joint and the base of the proximal phalanx were resected.     Using standard AO fixation, two Lund bone staples were utilized for arthrodesis of the first metatarsal phalangeal joint.    Arthrodesis was performed with the toe in approximately 10 degrees dorsiflexion of the metatarsal phalangeal joint.  Upon completion of the procedure, the position of the great toe was assessed and noted to be in good position.     The wound was flushed with copious amounts of sterile normal saline.     The capsular structures were reapproximated and coapted utilizing 3-0 Vicryl.  Subcutaneous layer was reapproximated coapted utilizing 4-0 Vicryl.  The skin edges were approximated and coapted utilizing 4-0 Monocryl.     Upon completion of the procedure, the tourniquet was deflated with prompt hyperemic response seen to toes one through five on the left foot the total tourniquet time of 50 minutes.      The surgical site was dressed with Adaptic and covered with sterile compressive dressings consisting of Aquacel AG dressing, 4 x 4's, Kerlix and Coban. A posterior splint consisting of cast padding, 4 inch Ortho-Glass and an Ace wrap were were utilized when applied with the left foot rectus at 90 degrees at the ankle.     The surgery was performed with Litzy Luna RN, First Assist.  She performed as a first assist throughout the entire case with retracting, fixation, suturing, and postoperative dressing.    The patient tolerated the procedure and anesthesia well. The patient was transferred to the recovery room with vital signs stable and vascular status intact to toes one through five on the left foot.  Following a period of postoperative monitoring, the patient will be discharged home, having been given written and oral postoperative instructions. The patient is to contact    Dr. Mcneill for postoperative followup care and if any problems should  arise.

## 2024-07-26 NOTE — DISCHARGE INSTRUCTIONS
DISCHARGE INSTRUCTIONS  PODIATRY SURGERY       For your surgery you had:  General anesthesia (you may have a sore throat for the first 24 hours)  Local anesthesia  You may experience dizziness, drowsiness, or light-headedness for several hours following surgery  Do not stay alone today or tonight.  Limit your activity for 24 hours.  Resume your diet slowly.  Follow whatever special dietary instructions you may have been given by the doctor.  You should not drive or operate machinery or drink alcohol for 24 hours or while you are taking pain medication.You should not sign any legally binding documents.  DO NOT TOUCH DRESSING.  You may shower: KEEP DRESSING DRY.  Sleep with the injured part elevated on a pillow.  Follow verbal instructions of your doctor.  Sit with the lower leg propped up on a footstool or chair with pillows.    SPECIAL INSTRUCTIONS:      Last dose of pain medication was given at:    Ice bag to injured area for 72 hours.  Apply 20 minutes on - 20 minutes off.  Never place ice directly on skin or cast.     Bend knee and rotate ankle for 5 minutes every hour to support circulation.  DO NOT REMOVE DRESSING. KEEP DRESSING DRY. You may try to bathe in a tub, while keeping foot out of tub.  Small amount of bleeding through bandage may occur and is normal, unless it becomes heavy or persists, call office in this case.  Eat well balanced diet high in protein and vitamin c, may take supplemental vitamins and calcium. Drink plenty of fluids and get plenty of rest with foot elevated.  Use crutches, walker or cane for ambulation depending on weight bearing status. No driving until released by MD.      NOTIFY THE PHYSICIAN IF YOU EXPERIENCE:  Numbness of toes.  Inability to move toes.  Extreme coldness, paleness or blue dis-coloration of toes.  Excessive swelling of affected surgical site or swelling that causes the cast to rub or cut into skin.  Pain unrelieved by pain medication  Nausea/vomiting not relieved  by prescribed medication  Unable to urinate in 6 hours after surgery  Temperature greater than 101 degree Fahrenheit or chills  If unable to reach your doctor, please go to the closest emergency room                             [facility] Post-Surgical Instructions    Go directly home and try to keep your feet elevated by sitting in the back seat of the car and placing your foot on the seat. Have your prescriptions filled immediately.    Elevate feet above the level of the heart by supporting your feet and legs with pillows. Lying on a couch with 3-4 pillows works well. Elevation helps reduce swelling and should be used whenever you are resting.    Place an ice bag covered with a towel on your ankle area and/or behind your knee for no longer than 20 minutes, then keep ice off of foot for at least 20 minutes. The best way to remember this is 20 minutes on, then 20 minutes off. Continue this for the first week while awake. Ice helps to reduce swelling and inflammation. Do NOT sleep with ice on your foot or leg.    To promote circulation and healing, bend your knee and rotate your foot and ankle for 5 minutes each hour. Dangle your feet down for 1-2 minutes at least once per hour, then continue to elevate.    Keep the bandages or cast clean and dry. Do NOT remove your bandages under any circumstances without consulting Dr. Mcneill. You may bathe by hanging your foot outside of the tub, but DO NOT attempt to shower.    Take your pain medication only as directed. Avoid alcoholwhile taking medication. If you experience nausea or lightheadedness, remain lying down and contact the office. You may prefer to use aspirin Tylenol, Motrin or other over the counter pain reliever.    A small amount of bleeding through the bandage may occur and should not cause concern unless it becomes heavy or persists. If this happens, contact the office. Do not become alarmed if you notice a bruised appearance to your feet  or toes.    Eat a well-balanced diet high in protein and vitamin C. Take supplemental vitamins and calcium. Drink plenty of fluids and get plenty of rest with your feet elevated.    Blankets may be kept from irritating the surgical site by using a large cardboard box to cradle the covers over the feet.    Use of crutches, a walker, or a cane can be useful in public areas to protect your feet. Do not attempt to operate a motor vehicle until directed by Dr. Mcneill.    Call the office if any of the following occur: bleeding that won't stop, injury to foot, fever, wet bandages, redness with throbbing, and pain unrelieved by medication.

## 2024-07-26 NOTE — ANESTHESIA PREPROCEDURE EVALUATION
Anesthesia Evaluation     Patient summary reviewed and Nursing notes reviewed   no history of anesthetic complications:   NPO Solid Status: > 8 hours  NPO Liquid Status: > 2 hours           Airway   Mallampati: I  TM distance: >3 FB  Neck ROM: full  No difficulty expected  Dental      Pulmonary - negative pulmonary ROS and normal exam    breath sounds clear to auscultation  Cardiovascular - negative cardio ROS and normal exam  Exercise tolerance: good (4-7 METS)    Rhythm: regular  Rate: normal        Neuro/Psych  (+) TIA (24 yrs ago)  GI/Hepatic/Renal/Endo    (+) obesity    Musculoskeletal     Abdominal    Substance History      OB/GYN          Other        ROS/Med Hx Other: PAT Nursing Notes unavailable.               Anesthesia Plan    ASA 2     general     (Patient understands anesthesia not responsible for dental damage.)  intravenous induction     Anesthetic plan, risks, benefits, and alternatives have been provided, discussed and informed consent has been obtained with: patient.    Plan discussed with CRNA.    CODE STATUS:

## 2024-07-26 NOTE — ANESTHESIA POSTPROCEDURE EVALUATION
Patient: Meme Dunne    Procedure Summary       Date: 07/26/24 Room / Location: [facility] OR 04 / [facility] MAIN OR    Anesthesia Start: 1317 Anesthesia Stop: 1450    Procedures:       ARTHRODESIS METATARSALPHALANGEAL JOINT - Left 1st MPJ (Left)      CAST APPLICATION LEG (Left) Diagnosis:       Hallux limitus of left foot      (Hallux limitus of left foot [M20.5X2])    Surgeons: Meir Mcneill DPM Provider: Lavell Carlson MD    Anesthesia Type: general ASA Status: 2            Anesthesia Type: general    Vitals  Vitals Value Taken Time   /76 07/26/24 1525   Temp 36.4 °C (97.5 °F) 07/26/24 1525   Pulse 67 07/26/24 1527   Resp 14 07/26/24 1525   SpO2 99 % 07/26/24 1527   Vitals shown include unfiled device data.        Post Anesthesia Care and Evaluation    Patient location during evaluation: bedside  Patient participation: complete - patient participated  Level of consciousness: awake  Pain management: adequate    Airway patency: patent  PONV Status: none  Cardiovascular status: acceptable and stable  Respiratory status: acceptable  Hydration status: acceptable

## 2024-07-30 ENCOUNTER — OFFICE VISIT (OUTPATIENT)
Dept: PODIATRY | Facility: CLINIC | Age: 54
End: 2024-07-30
Payer: COMMERCIAL

## 2024-07-30 VITALS
HEIGHT: 66 IN | OXYGEN SATURATION: 98 % | SYSTOLIC BLOOD PRESSURE: 136 MMHG | DIASTOLIC BLOOD PRESSURE: 87 MMHG | TEMPERATURE: 97.7 F | BODY MASS INDEX: 31.21 KG/M2 | HEART RATE: 91 BPM

## 2024-07-30 DIAGNOSIS — M79.672 FOOT PAIN, LEFT: ICD-10-CM

## 2024-07-30 DIAGNOSIS — M20.5X2 HALLUX LIMITUS OF LEFT FOOT: Primary | ICD-10-CM

## 2024-07-30 NOTE — PROGRESS NOTES
[facility] - PODIATRY    Today's Date: 07/30/24    Patient Name: Meme Dunne  MRN: [id number]  CSN: [id number]  PCP: Woody Lynch MD  Referring Provider: No ref. provider found    SUBJECTIVE     Chief Complaint   Patient presents with    Left Foot - Post-op Follow-up     HPI: Meme Dunne, a 53 y.o.female, presents to clinic.    Procedure: Left first metatarsal phalangeal joint arthrodesis  Date: 26 July 2024    Patient states they are doing well without complications.  Patient states they are following post-op instructions.  Patient states pain is controlled.      Patient denies any fevers, chills, nausea, vomiting, shortness of breath, nor any other constitutional signs nor symptoms.    Past Medical History:   Diagnosis Date    Great toe pain, left     Great toe pain, right     TIA (transient ischemic attack)     24 years ago, no residual     Past Surgical History:   Procedure Laterality Date    ANAL FISSURECTOMY      APPENDECTOMY      CAST APPLICATION Left 7/26/2024    Procedure: CAST APPLICATION LEG;  Surgeon: Meir Mcneill DPM;  Location: [facility] MAIN OR;  Service: Podiatry;  Laterality: Left;    CHOLECYSTECTOMY      COLONOSCOPY N/A 04/11/2022    Procedure: COLONOSCOPY;  Surgeon: William Luna MD;  Location: [facility] ENDOSCOPY;  Service: General;  Laterality: N/A;  NORMAL COLON    FOOT SURGERY Right 2020    HYSTERECTOMY  1999    MTP JOINT FUSION Left 7/26/2024    Procedure: ARTHRODESIS METATARSALPHALANGEAL JOINT - Left 1st MPJ;  Surgeon: Meir Mcneill DPM;  Location: [facility] MAIN OR;  Service: Podiatry;  Laterality: Left;     Family History   Problem Relation Age of Onset    Hypertension Father      Social History     Socioeconomic History    Marital status:    Tobacco Use    Smoking status: Never    Smokeless tobacco: Never   Vaping Use    Vaping status: Never Used   Substance and Sexual Activity    Alcohol use: Never    Drug use: Never    Sexual  "activity: Defer     No Known Allergies  Current Outpatient Medications   Medication Sig Dispense Refill    amphetamine-dextroamphetamine (ADDERALL) 15 MG tablet Take 1 tablet by mouth 2 (Two) Times a Day.      HYDROcodone-acetaminophen (NORCO) 5-325 MG per tablet Take 1-2 tablets by mouth Every 4 (Four) Hours As Needed (Pain). 30 tablet 0    promethazine (PHENERGAN) 12.5 MG tablet Take 1 tablet by mouth Every 8 (Eight) Hours As Needed for Nausea or Vomiting. 30 tablet 0    SEMAGLUTIDE-WEIGHT MANAGEMENT SC Inject 20 Units under the skin into the appropriate area as directed. Every 15 days       No current facility-administered medications for this visit.     Review of Systems   Constitutional: Negative.    Musculoskeletal:         Left first MPJ fusion postop visit   All other systems reviewed and are negative.      OBJECTIVE     Vitals:    07/30/24 0919   BP: 136/87   Pulse: 91   Temp: 97.7 °F (36.5 °C)   SpO2: 98%       No results found for: \"WBC\", \"RBC\", \"HGB\", \"HCT\", \"MCV\", \"MCH\", \"MCHC\", \"RDW\", \"RDWSD\", \"MPV\", \"PLT\", \"NEUTRORELPCT\", \"LYMPHORELPCT\", \"MONORELPCT\", \"EOSRELPCT\", \"BASORELPCT\", \"AUTOIGPER\", \"NEUTROABS\", \"LYMPHSABS\", \"MONOSABS\", \"EOSABS\", \"BASOSABS\", \"AUTOIGNUM\", \"NRBC\"      No results found for: \"GLUCOSE\", \"BUN\", \"CREATININE\", \"EGFRRESULT\", \"EGFR\", \"BCR\", \"K\", \"CO2\", \"CALCIUM\", \"PROTENTOTREF\", \"ALBUMIN\", \"BILITOT\", \"AST\", \"ALT\"    Patient seen in no apparent distress.      PHYSICAL EXAM:     Foot/Ankle Exam    GENERAL  Appearance:  appears stated age  Orientation:  AAOx3  Affect:  appropriate  Assistance:  knee scooter  Right shoe gear: casual shoe    VASCULAR     Right Foot Vascularity   Normal vascular exam    Dorsalis pedis:  2+  Posterior tibial:  2+  Skin temperature:  warm  Edema grading:  None  CFT:  < 3 seconds  Pedal hair growth:  Present  Varicosities:  none     Left Foot Vascularity   Normal vascular exam    Dorsalis pedis:  2+  Posterior tibial:  2+  Skin temperature:  warm  Edema grading: "  None  CFT:  < 3 seconds  Pedal hair growth:  Present  Varicosities:  none     NEUROLOGIC     Right Foot Neurologic   Normal sensation    Light touch sensation: normal  Vibratory sensation: normal  Hot/Cold sensation: normal  Protective Sensation using [place]ell Monofilament:   Sites intact: 10  Sites tested: 10     Left Foot Neurologic   Normal sensation    Light touch sensation: normal  Vibratory sensation: normal  Hot/Cold sensation:  normal  Protective Sensation using [place]-Chanell Monofilament:   Sites intact: 10  Sites tested: 10    MUSCULOSKELETAL     Right Foot Musculoskeletal   Tenderness:  MTP 1 dorsal tenderness    Hallux limitus: Yes      MUSCLE STRENGTH     Right Foot Muscle Strength   Foot dorsiflexion:  4  Foot plantar flexion:  4  Foot inversion:  4  Foot eversion:  4     Left Foot Muscle Strength   Foot dorsiflexion:  4  Foot plantar flexion:  4  Foot inversion:  4  Foot eversion:  4    RANGE OF MOTION     Right Foot Range of Motion   Foot and ankle ROM within normal limits       Left Foot Range of Motion   Foot and ankle ROM within normal limits      DERMATOLOGIC      Right Foot Dermatologic   Skin  Right foot skin is intact.      Left Foot Dermatologic   Skin  Left foot skin is intact.      Left foot additional comments: Left foot shows posterior splint and dressing are dry and intact without signs of breakthrough.  First ray shows sutures intact with skin edges well-coapted with no signs of dehiscence.  Healthy surgical skin edges.  No drainage present.  No edema, erythema, calor, lymphangitis, nor signs of infection seen.  Hallux is in rectus position.      RADIOLOGY:      XR Foot 3+ View Left    Result Date: 7/30/2024  Narrative: IN-OFFICE IMAGING:  Standing, weightbearing, 3 view, AP, MO, Lateral, Left foot Indication: Postoperative Findings: Arthrodesis of first metatarsal phalangeal joint with bone staples.  Bone staples in original postoperative position.  Hallux in rectus  position. Comparison: No other changes seen compared to previous views.  ASSESSMENT/PLAN     Diagnoses and all orders for this visit:    1. Hallux limitus of left foot (Primary)    2. Foot pain, left        Comprehensive lower extremity examination and evaluation was performed.    Discussed findings and treatment plan including risks, benefits, and treatment options with patient in detail. Patient agreed with treatment plan.    Medications and allergies reviewed.  Reviewed available lab values along with other pertinent labs.  These were discussed with the patient.    Extremity:  Left leg, Short Leg cast.    Patient was placed in fiberglass cast today. The patient tolerated the procedure without any complications., Neurovascular status was evaluated post cast application and was within normal limits. The cast was not causing impingement on neuro-vasculature or vital structures.    Patient is to not weight bear to the affected foot at this time.  Patient states understanding and agreement with this plan.    An After Visit Summary was printed and given to the patient at discharge, including (if requested) any available informative/educational handouts regarding diagnosis, treatment, or medications. All questions were answered to patient/family satisfaction. Should symptoms fail to improve or worsen they agree to call or return to clinic or to go to the Emergency Department. Discussed the importance of following up with any needed screening tests/labs/specialist appointments and any requested follow-up recommended by me today. Importance of maintaining follow-up discussed and patient accepts that missed appointments can delay diagnosis and potentially lead to worsening of conditions.    Return in about 3 weeks (around 8/20/2024) for Post Operative, X-ray needed, Cast change., or sooner if acute issues arise.    This document has been electronically signed by Meir Mcneill DPM on July 30, 2024 09:56 EDT

## 2024-08-20 ENCOUNTER — OFFICE VISIT (OUTPATIENT)
Dept: PODIATRY | Facility: CLINIC | Age: 54
End: 2024-08-20
Payer: COMMERCIAL

## 2024-08-20 VITALS
HEART RATE: 78 BPM | WEIGHT: 193 LBS | HEIGHT: 66 IN | BODY MASS INDEX: 31.02 KG/M2 | OXYGEN SATURATION: 98 % | SYSTOLIC BLOOD PRESSURE: 127 MMHG | DIASTOLIC BLOOD PRESSURE: 87 MMHG | TEMPERATURE: 98 F

## 2024-08-20 DIAGNOSIS — M20.5X2 HALLUX LIMITUS OF LEFT FOOT: Primary | ICD-10-CM

## 2024-08-20 DIAGNOSIS — M79.672 FOOT PAIN, LEFT: ICD-10-CM

## 2024-08-20 PROCEDURE — 99024 POSTOP FOLLOW-UP VISIT: CPT | Performed by: PODIATRIST

## 2024-08-20 PROCEDURE — 29405 APPL SHORT LEG CAST: CPT | Performed by: PODIATRIST

## 2024-08-20 NOTE — PROGRESS NOTES
[facility] - PODIATRY    Today's Date: 08/20/24    Patient Name: Meme Dunne  MRN: [id number]  CSN: [id number]  PCP: Woody Lynch MD  Referring Provider: No ref. provider found    SUBJECTIVE     Chief Complaint   Patient presents with    Left Foot - Post-op Follow-up     Cast change     HPI: Meme Dunne, a 53 y.o.female, presents to clinic.    Procedure: Left first metatarsal phalangeal joint arthrodesis  Date: 26 July 2024    Patient states they are doing well without complications.  Patient states they are following post-op instructions.  Patient states pain is controlled.      Patient denies any fevers, chills, nausea, vomiting, shortness of breath, nor any other constitutional signs nor symptoms.    Past Medical History:   Diagnosis Date    Great toe pain, left     Great toe pain, right     TIA (transient ischemic attack)     24 years ago, no residual     Past Surgical History:   Procedure Laterality Date    ANAL FISSURECTOMY      APPENDECTOMY      CAST APPLICATION Left 7/26/2024    Procedure: CAST APPLICATION LEG;  Surgeon: Meir Mcneill DPM;  Location: [facility] OR;  Service: Podiatry;  Laterality: Left;    CHOLECYSTECTOMY      COLONOSCOPY N/A 04/11/2022    Procedure: COLONOSCOPY;  Surgeon: William Luna MD;  Location: [facility] ENDOSCOPY;  Service: General;  Laterality: N/A;  NORMAL COLON    FOOT SURGERY Right 2020    HYSTERECTOMY  1999    MTP JOINT FUSION Left 7/26/2024    Procedure: ARTHRODESIS METATARSALPHALANGEAL JOINT - Left 1st MPJ;  Surgeon: Meir Mcneill DPM;  Location: [facility] OR;  Service: Podiatry;  Laterality: Left;     Family History   Problem Relation Age of Onset    Hypertension Father      Social History     Socioeconomic History    Marital status:    Tobacco Use    Smoking status: Never    Smokeless tobacco: Never   Vaping Use    Vaping status: Never Used   Substance and Sexual Activity    Alcohol use: Never    Drug use: Never  "   Sexual activity: Defer     No Known Allergies  Current Outpatient Medications   Medication Sig Dispense Refill    amphetamine-dextroamphetamine (ADDERALL) 15 MG tablet Take 1 tablet by mouth 2 (Two) Times a Day.      HYDROcodone-acetaminophen (NORCO) 5-325 MG per tablet Take 1-2 tablets by mouth Every 4 (Four) Hours As Needed (Pain). 30 tablet 0    promethazine (PHENERGAN) 12.5 MG tablet Take 1 tablet by mouth Every 8 (Eight) Hours As Needed for Nausea or Vomiting. 30 tablet 0    SEMAGLUTIDE-WEIGHT MANAGEMENT SC Inject 20 Units under the skin into the appropriate area as directed. Every 15 days       No current facility-administered medications for this visit.     Review of Systems   Constitutional: Negative.    Musculoskeletal:         Left first MPJ fusion postop visit   All other systems reviewed and are negative.      OBJECTIVE     Vitals:    08/20/24 1422   BP: 127/87   Pulse: 78   Temp: 98 °F (36.7 °C)   SpO2: 98%         No results found for: \"WBC\", \"RBC\", \"HGB\", \"HCT\", \"MCV\", \"MCH\", \"MCHC\", \"RDW\", \"RDWSD\", \"MPV\", \"PLT\", \"NEUTRORELPCT\", \"LYMPHORELPCT\", \"MONORELPCT\", \"EOSRELPCT\", \"BASORELPCT\", \"AUTOIGPER\", \"NEUTROABS\", \"LYMPHSABS\", \"MONOSABS\", \"EOSABS\", \"BASOSABS\", \"AUTOIGNUM\", \"NRBC\"      No results found for: \"GLUCOSE\", \"BUN\", \"CREATININE\", \"EGFRRESULT\", \"EGFR\", \"BCR\", \"K\", \"CO2\", \"CALCIUM\", \"PROTENTOTREF\", \"ALBUMIN\", \"BILITOT\", \"AST\", \"ALT\"    Patient seen in no apparent distress.      PHYSICAL EXAM:     Foot/Ankle Exam    GENERAL  Appearance:  appears stated age  Orientation:  AAOx3  Affect:  appropriate  Assistance:  knee scooter  Right shoe gear: casual shoe    VASCULAR     Right Foot Vascularity   Normal vascular exam    Dorsalis pedis:  2+  Posterior tibial:  2+  Skin temperature:  warm  Edema grading:  None  CFT:  < 3 seconds  Pedal hair growth:  Present  Varicosities:  none     Left Foot Vascularity   Normal vascular exam    Dorsalis pedis:  2+  Posterior tibial:  2+  Skin temperature:  " warm  Edema grading:  None  CFT:  < 3 seconds  Pedal hair growth:  Present  Varicosities:  none     NEUROLOGIC     Right Foot Neurologic   Normal sensation    Light touch sensation: normal  Vibratory sensation: normal  Hot/Cold sensation: normal  Protective Sensation using [place]-Chanell Monofilament:   Sites intact: 10  Sites tested: 10     Left Foot Neurologic   Normal sensation    Light touch sensation: normal  Vibratory sensation: normal  Hot/Cold sensation:  normal  Protective Sensation using [place]-Chanell Monofilament:   Sites intact: 10  Sites tested: 10    MUSCULOSKELETAL     Right Foot Musculoskeletal   Tenderness:  MTP 1 dorsal tenderness    Hallux limitus: Yes      MUSCLE STRENGTH     Right Foot Muscle Strength   Foot dorsiflexion:  4  Foot plantar flexion:  4  Foot inversion:  4  Foot eversion:  4     Left Foot Muscle Strength   Foot dorsiflexion:  4  Foot plantar flexion:  4  Foot inversion:  4  Foot eversion:  4    RANGE OF MOTION     Right Foot Range of Motion   Foot and ankle ROM within normal limits       Left Foot Range of Motion   Foot and ankle ROM within normal limits      DERMATOLOGIC      Right Foot Dermatologic   Skin  Right foot skin is intact.      Left Foot Dermatologic   Skin  Left foot skin is intact.      Left foot additional comments: Left foot shows below-knee fiberglass cast and dressing are dry and intact without signs of breakthrough.  First ray shows sutures intact with skin edges well-coapted with no signs of dehiscence.  Healthy surgical skin edges.  No drainage present.  No edema, erythema, calor, lymphangitis, nor signs of infection seen.  Hallux is in rectus position.      RADIOLOGY:      XR Foot 3+ View Left    Result Date: 20 August 2024  Narrative: IN-OFFICE IMAGING:  Standing, weightbearing, 3 view, AP, MO, Lateral, Left foot     Indication: Postoperative     Findings: Arthrodesis of first metatarsal phalangeal joint with bone staples.  Bone staples in original  postoperative position.  Hallux remains in rectus position.  Early trabeculation seen across arthrodesis site and first MPJ     Comparison: No other changes seen compared to previous views.  ASSESSMENT/PLAN     Diagnoses and all orders for this visit:    1. Hallux limitus of left foot (Primary)    2. Foot pain, left    Comprehensive lower extremity examination and evaluation was performed.    Discussed findings and treatment plan including risks, benefits, and treatment options with patient in detail. Patient agreed with treatment plan.    Medications and allergies reviewed.  Reviewed available lab values along with other pertinent labs.  These were discussed with the patient.    Extremity:  Left leg, Short Leg cast.    Patient was placed in fiberglass cast today. The patient tolerated the procedure without any complications., Neurovascular status was evaluated post cast application and was within normal limits. The cast was not causing impingement on neuro-vasculature or vital structures.    Patient is to not weight bear to the affected foot at this time.  Patient states understanding and agreement with this plan.    An After Visit Summary was printed and given to the patient at discharge, including (if requested) any available informative/educational handouts regarding diagnosis, treatment, or medications. All questions were answered to patient/family satisfaction. Should symptoms fail to improve or worsen they agree to call or return to clinic or to go to the Emergency Department. Discussed the importance of following up with any needed screening tests/labs/specialist appointments and any requested follow-up recommended by me today. Importance of maintaining follow-up discussed and patient accepts that missed appointments can delay diagnosis and potentially lead to worsening of conditions.    Return in about 3 weeks (around 9/10/2024) for Post Operative, X-ray needed, Cast removal., or sooner if acute issues  arise.    This document has been electronically signed by Meir Mcneill DPM on August 20, 2024 14:40 EDT

## 2024-09-10 ENCOUNTER — OFFICE VISIT (OUTPATIENT)
Dept: PODIATRY | Facility: CLINIC | Age: 54
End: 2024-09-10
Payer: COMMERCIAL

## 2024-09-10 VITALS
SYSTOLIC BLOOD PRESSURE: 131 MMHG | OXYGEN SATURATION: 95 % | TEMPERATURE: 98.3 F | HEART RATE: 75 BPM | BODY MASS INDEX: 31.15 KG/M2 | WEIGHT: 193 LBS | DIASTOLIC BLOOD PRESSURE: 89 MMHG

## 2024-09-10 DIAGNOSIS — M79.672 FOOT PAIN, LEFT: ICD-10-CM

## 2024-09-10 DIAGNOSIS — M20.5X2 HALLUX LIMITUS OF LEFT FOOT: Primary | ICD-10-CM

## 2024-09-10 PROCEDURE — 99024 POSTOP FOLLOW-UP VISIT: CPT | Performed by: PODIATRIST

## 2024-09-10 NOTE — PROGRESS NOTES
[facility] - PODIATRY    Today's Date: 09/10/24    Patient Name: Meme Dunne  MRN: [id number]  CSN: [id number]  PCP: Woody Lynch MD  Referring Provider: No ref. provider found    SUBJECTIVE     Chief Complaint   Patient presents with    Left Foot - Post-op     Here for cast off. Surgery 7/26/24     HPI: Meme Dunne, a 54 y.o.female, presents to clinic.    Procedure: Left first metatarsal phalangeal joint arthrodesis  Date: 26 July 2024    Patient states they are doing well without complications.  Patient states they are following post-op instructions.  Patient states pain is controlled.      Patient denies any fevers, chills, nausea, vomiting, shortness of breath, nor any other constitutional signs nor symptoms.    Past Medical History:   Diagnosis Date    Great toe pain, left     Great toe pain, right     TIA (transient ischemic attack)     24 years ago, no residual     Past Surgical History:   Procedure Laterality Date    ANAL FISSURECTOMY      APPENDECTOMY      CAST APPLICATION Left 7/26/2024    Procedure: CAST APPLICATION LEG;  Surgeon: Meir Mcneill DPM;  Location: [facility] MAIN OR;  Service: Podiatry;  Laterality: Left;    CHOLECYSTECTOMY      COLONOSCOPY N/A 04/11/2022    Procedure: COLONOSCOPY;  Surgeon: William Luna MD;  Location: [facility] ENDOSCOPY;  Service: General;  Laterality: N/A;  NORMAL COLON    FOOT SURGERY Right 2020    HYSTERECTOMY  1999    MTP JOINT FUSION Left 7/26/2024    Procedure: ARTHRODESIS METATARSALPHALANGEAL JOINT - Left 1st MPJ;  Surgeon: Meir Mcneill DPM;  Location: [facility] MAIN OR;  Service: Podiatry;  Laterality: Left;     Family History   Problem Relation Age of Onset    Hypertension Father      Social History     Socioeconomic History    Marital status:    Tobacco Use    Smoking status: Never    Smokeless tobacco: Never   Vaping Use    Vaping status: Never Used   Substance and Sexual Activity    Alcohol use: Never     "Drug use: Never    Sexual activity: Defer     No Known Allergies  Current Outpatient Medications   Medication Sig Dispense Refill    amphetamine-dextroamphetamine (ADDERALL) 15 MG tablet Take 1 tablet by mouth 2 (Two) Times a Day.      SEMAGLUTIDE-WEIGHT MANAGEMENT SC Inject 20 Units under the skin into the appropriate area as directed. Every 15 days      HYDROcodone-acetaminophen (NORCO) 5-325 MG per tablet Take 1-2 tablets by mouth Every 4 (Four) Hours As Needed (Pain). (Patient not taking: Reported on 9/10/2024) 30 tablet 0    promethazine (PHENERGAN) 12.5 MG tablet Take 1 tablet by mouth Every 8 (Eight) Hours As Needed for Nausea or Vomiting. (Patient not taking: Reported on 9/10/2024) 30 tablet 0     No current facility-administered medications for this visit.     Review of Systems   Constitutional: Negative.    Musculoskeletal:         Left first MPJ fusion postop visit   All other systems reviewed and are negative.      OBJECTIVE     Vitals:    09/10/24 0824   BP: 131/89   Pulse: 75   Temp: 98.3 °F (36.8 °C)   SpO2: 95%         No results found for: \"WBC\", \"RBC\", \"HGB\", \"HCT\", \"MCV\", \"MCH\", \"MCHC\", \"RDW\", \"RDWSD\", \"MPV\", \"PLT\", \"NEUTRORELPCT\", \"LYMPHORELPCT\", \"MONORELPCT\", \"EOSRELPCT\", \"BASORELPCT\", \"AUTOIGPER\", \"NEUTROABS\", \"LYMPHSABS\", \"MONOSABS\", \"EOSABS\", \"BASOSABS\", \"AUTOIGNUM\", \"NRBC\"      No results found for: \"GLUCOSE\", \"BUN\", \"CREATININE\", \"EGFRRESULT\", \"EGFR\", \"BCR\", \"K\", \"CO2\", \"CALCIUM\", \"PROTENTOTREF\", \"ALBUMIN\", \"BILITOT\", \"AST\", \"ALT\"    Patient seen in no apparent distress.      PHYSICAL EXAM:     Foot/Ankle Exam    GENERAL  Appearance:  appears stated age  Orientation:  AAOx3  Affect:  appropriate  Assistance:  knee scooter  Right shoe gear: casual shoe    VASCULAR     Right Foot Vascularity   Normal vascular exam    Dorsalis pedis:  2+  Posterior tibial:  2+  Skin temperature:  warm  Edema grading:  None  CFT:  < 3 seconds  Pedal hair growth:  Present  Varicosities:  none     Left Foot " Vascularity   Normal vascular exam    Dorsalis pedis:  2+  Posterior tibial:  2+  Skin temperature:  warm  Edema grading:  None  CFT:  < 3 seconds  Pedal hair growth:  Present  Varicosities:  none     NEUROLOGIC     Right Foot Neurologic   Normal sensation    Light touch sensation: normal  Vibratory sensation: normal  Hot/Cold sensation: normal  Protective Sensation using [place] Monofilament:   Sites intact: 10  Sites tested: 10     Left Foot Neurologic   Normal sensation    Light touch sensation: normal  Vibratory sensation: normal  Hot/Cold sensation:  normal  Protective Sensation using [place]ell Monofilament:   Sites intact: 10  Sites tested: 10    MUSCULOSKELETAL     Right Foot Musculoskeletal   Tenderness:  MTP 1 dorsal tenderness    Hallux limitus: Yes      MUSCLE STRENGTH     Right Foot Muscle Strength   Foot dorsiflexion:  4  Foot plantar flexion:  4  Foot inversion:  4  Foot eversion:  4     Left Foot Muscle Strength   Foot dorsiflexion:  4  Foot plantar flexion:  4  Foot inversion:  4  Foot eversion:  4    RANGE OF MOTION     Right Foot Range of Motion   Foot and ankle ROM within normal limits       Left Foot Range of Motion   Foot and ankle ROM within normal limits      DERMATOLOGIC      Right Foot Dermatologic   Skin  Right foot skin is intact.      Left Foot Dermatologic   Skin  Left foot skin is intact.      Left foot additional comments: Left foot shows below-knee fiberglass cast and dressing are dry and intact without signs of breakthrough.  First ray shows sutures intact with skin edges well-coapted with no signs of dehiscence.  Healthy surgical skin edges.  No drainage present.  No edema, erythema, calor, lymphangitis, nor signs of infection seen.  Hallux is in rectus position.    No pressure lesions seen upon removal of cast.    RADIOLOGY:      XR Foot 3+ View Left    Result Date: 9/10/2024  Narrative: IN-OFFICE IMAGING:  Standing, weightbearing, 3 view, AP, MO, Lateral, Left foot   Indication: Postoperative  Findings: Arthrodesis of first metatarsal phalangeal joint with bone staples.  Bone staples in original postoperative position.  Hallux remains in rectus position.  Increased and improvement in trabeculation seen across arthrodesis site and first MPJ consistent with proper healing.  Comparison: No other changes seen compared to previous views.     ASSESSMENT/PLAN     Diagnoses and all orders for this visit:    1. Hallux limitus of left foot (Primary)    2. Foot pain, left    Comprehensive lower extremity examination and evaluation was performed.    Discussed findings and treatment plan including risks, benefits, and treatment options with patient in detail. Patient agreed with treatment plan.    Medications and allergies reviewed.  Reviewed available lab values along with other pertinent labs.  These were discussed with the patient.    Patient may begin bleeding in surgical shoe only.  The patient states understanding and agreement with this plan.    Rice Therapy: It is important to treat any injury as soon as possible to help control swelling and increase recovery time. The recognized regimen for immediate treatment of sport injuries includes rest, ice (cold application), compression, and elevation (RICE). Remove the injured athlete from play, apply ice to the affected area, wrap or compress the injured area with an elastic bandage when appropriate, and elevate the injured area above heart level to reduce swelling.  The patient is to not use ice for longer than 20 minutes at a time, with at least 20 minutes of no ice usage between applications.     Patient instructed use OTC analgesics with dosing per package insert as needed.      The patient states understanding and agreement with this plan.    An After Visit Summary was printed and given to the patient at discharge, including (if requested) any available informative/educational handouts regarding diagnosis, treatment, or medications.  All questions were answered to patient/family satisfaction. Should symptoms fail to improve or worsen they agree to call or return to clinic or to go to the Emergency Department. Discussed the importance of following up with any needed screening tests/labs/specialist appointments and any requested follow-up recommended by me today. Importance of maintaining follow-up discussed and patient accepts that missed appointments can delay diagnosis and potentially lead to worsening of conditions.    Return in about 3 weeks (around 10/1/2024) for Post Operative, X-ray needed., or sooner if acute issues arise.    This document has been electronically signed by Meir Mcneill DPM on September 10, 2024 08:53 EDT

## 2024-10-01 ENCOUNTER — OFFICE VISIT (OUTPATIENT)
Dept: PODIATRY | Facility: CLINIC | Age: 54
End: 2024-10-01
Payer: COMMERCIAL

## 2024-10-01 VITALS
WEIGHT: 191 LBS | DIASTOLIC BLOOD PRESSURE: 77 MMHG | TEMPERATURE: 97.7 F | OXYGEN SATURATION: 96 % | HEIGHT: 66 IN | HEART RATE: 67 BPM | SYSTOLIC BLOOD PRESSURE: 116 MMHG | BODY MASS INDEX: 30.7 KG/M2

## 2024-10-01 DIAGNOSIS — M20.5X2 HALLUX LIMITUS OF LEFT FOOT: Primary | ICD-10-CM

## 2024-10-01 DIAGNOSIS — M79.672 FOOT PAIN, LEFT: ICD-10-CM

## 2024-10-01 PROCEDURE — 99024 POSTOP FOLLOW-UP VISIT: CPT | Performed by: PODIATRIST

## 2024-10-01 NOTE — PROGRESS NOTES
[facility] - PODIATRY    Today's Date: 10/01/24    Patient Name: Meme Dunne  MRN: [id number]  CSN: [id number]  PCP: Woody Lynch MD  Referring Provider: No ref. provider found    SUBJECTIVE     Chief Complaint   Patient presents with    Left Foot - Post-op Follow-up     Doing well continues with post op shoe     HPI: Meme Dunne, a 54 y.o.female, presents to clinic.    Procedure: Left first metatarsal phalangeal joint arthrodesis  Date: 26 July 2024    Patient states they are doing well without complications.  Patient states they are following post-op instructions.  Patient states pain is controlled.      Patient states being pleased with postop results.    Patient denies any fevers, chills, nausea, vomiting, shortness of breath, nor any other constitutional signs nor symptoms.    Past Medical History:   Diagnosis Date    Great toe pain, left     Great toe pain, right     TIA (transient ischemic attack)     24 years ago, no residual     Past Surgical History:   Procedure Laterality Date    ANAL FISSURECTOMY      APPENDECTOMY      CAST APPLICATION Left 7/26/2024    Procedure: CAST APPLICATION LEG;  Surgeon: Meir Mcneill DPM;  Location: [facility] MAIN OR;  Service: Podiatry;  Laterality: Left;    CHOLECYSTECTOMY      COLONOSCOPY N/A 04/11/2022    Procedure: COLONOSCOPY;  Surgeon: William Luna MD;  Location: [facility] ENDOSCOPY;  Service: General;  Laterality: N/A;  NORMAL COLON    FOOT SURGERY Right 2020    HYSTERECTOMY  1999    MTP JOINT FUSION Left 7/26/2024    Procedure: ARTHRODESIS METATARSALPHALANGEAL JOINT - Left 1st MPJ;  Surgeon: Meir Mcneill DPM;  Location: [facility] MAIN OR;  Service: Podiatry;  Laterality: Left;     Family History   Problem Relation Age of Onset    Hypertension Father      Social History     Socioeconomic History    Marital status:    Tobacco Use    Smoking status: Never    Smokeless tobacco: Never   Vaping Use    Vaping status:  "Never Used   Substance and Sexual Activity    Alcohol use: Never    Drug use: Never    Sexual activity: Defer     No Known Allergies  Current Outpatient Medications   Medication Sig Dispense Refill    amphetamine-dextroamphetamine (ADDERALL) 15 MG tablet Take 1 tablet by mouth 2 (Two) Times a Day.      SEMAGLUTIDE-WEIGHT MANAGEMENT SC Inject 20 Units under the skin into the appropriate area as directed. Every 15 days      HYDROcodone-acetaminophen (NORCO) 5-325 MG per tablet Take 1-2 tablets by mouth Every 4 (Four) Hours As Needed (Pain). (Patient not taking: Reported on 9/10/2024) 30 tablet 0    promethazine (PHENERGAN) 12.5 MG tablet Take 1 tablet by mouth Every 8 (Eight) Hours As Needed for Nausea or Vomiting. (Patient not taking: Reported on 9/10/2024) 30 tablet 0     No current facility-administered medications for this visit.     Review of Systems   Constitutional: Negative.    Musculoskeletal:         Left first MPJ fusion postop visit   All other systems reviewed and are negative.      OBJECTIVE     Vitals:    10/01/24 1012   BP: 116/77   Pulse: 67   Temp: 97.7 °F (36.5 °C)   SpO2: 96%         No results found for: \"WBC\", \"RBC\", \"HGB\", \"HCT\", \"MCV\", \"MCH\", \"MCHC\", \"RDW\", \"RDWSD\", \"MPV\", \"PLT\", \"NEUTRORELPCT\", \"LYMPHORELPCT\", \"MONORELPCT\", \"EOSRELPCT\", \"BASORELPCT\", \"AUTOIGPER\", \"NEUTROABS\", \"LYMPHSABS\", \"MONOSABS\", \"EOSABS\", \"BASOSABS\", \"AUTOIGNUM\", \"NRBC\"      No results found for: \"GLUCOSE\", \"BUN\", \"CREATININE\", \"EGFRRESULT\", \"EGFR\", \"BCR\", \"K\", \"CO2\", \"CALCIUM\", \"PROTENTOTREF\", \"ALBUMIN\", \"BILITOT\", \"AST\", \"ALT\"    Patient seen in no apparent distress.      PHYSICAL EXAM:     Foot/Ankle Exam    GENERAL  Appearance:  appears stated age  Orientation:  AAOx3  Affect:  appropriate  Assistance:  knee scooter  Right shoe gear: casual shoe  Left shoe gear: surgical shoe    VASCULAR     Right Foot Vascularity   Normal vascular exam    Dorsalis pedis:  2+  Posterior tibial:  2+  Skin temperature:  warm  Edema " grading:  None  CFT:  < 3 seconds  Pedal hair growth:  Present  Varicosities:  none     Left Foot Vascularity   Normal vascular exam    Dorsalis pedis:  2+  Posterior tibial:  2+  Skin temperature:  warm  Edema grading:  None  CFT:  < 3 seconds  Pedal hair growth:  Present  Varicosities:  none     NEUROLOGIC     Right Foot Neurologic   Normal sensation    Light touch sensation: normal  Vibratory sensation: normal  Hot/Cold sensation: normal  Protective Sensation using [place] Monofilament:   Sites intact: 10  Sites tested: 10     Left Foot Neurologic   Normal sensation    Light touch sensation: normal  Vibratory sensation: normal  Hot/Cold sensation:  normal  Protective Sensation using [place] Monofilament:   Sites intact: 10  Sites tested: 10    MUSCULOSKELETAL     Right Foot Musculoskeletal   Hallux limitus: Yes      MUSCLE STRENGTH     Right Foot Muscle Strength   Foot dorsiflexion:  4  Foot plantar flexion:  4  Foot inversion:  4  Foot eversion:  4     Left Foot Muscle Strength   Foot dorsiflexion:  4  Foot plantar flexion:  4  Foot inversion:  4  Foot eversion:  4    RANGE OF MOTION     Right Foot Range of Motion   Foot and ankle ROM within normal limits       Left Foot Range of Motion   Foot and ankle ROM within normal limits      DERMATOLOGIC      Right Foot Dermatologic   Skin  Right foot skin is intact.      Left Foot Dermatologic   Skin  Left foot skin is intact.      Left foot additional comments: Left foot shows First ray with skin edges well-coapted with no signs of dehiscence.  Healthy surgical skin edges.  No drainage present.  No edema, erythema, calor, lymphangitis, nor signs of infection seen.  Hallux is in rectus position.    RADIOLOGY:      XR Foot 3+ View Left    Result Date: 10/1/2024  Narrative: IN-OFFICE IMAGING: Standing, weightbearing, 3 view, AP, MO, Lateral, Left foot Indication: Postoperative Findings: Arthrodesis of first metatarsal phalangeal joint with bone staples.  Bone staples in original postoperative position.  No osteolytic changes seen around hardware placement.  Hallux remains in rectus position.  Continued increased and improvement in trabeculation seen across arthrodesis site and first MPJ consistent with proper healing. Comparison: No other changes seen compared to previous views.     ASSESSMENT/PLAN     Diagnoses and all orders for this visit:    1. Hallux limitus of left foot (Primary)    2. Foot pain, left    Comprehensive lower extremity examination and evaluation was performed.    Discussed findings and treatment plan including risks, benefits, and treatment options with patient in detail. Patient agreed with treatment plan.    Medications and allergies reviewed.  Reviewed available lab values along with other pertinent labs.  These were discussed with the patient.    Patient may begin to weight bear as tolerated in supportive shoes.  No impact activities for one month.  After that time, the patient may increase activities as tolerated.  Patient states understanding and agreement with this plan.    Rice Therapy: It is important to treat any injury as soon as possible to help control swelling and increase recovery time. The recognized regimen for immediate treatment of sport injuries includes rest, ice (cold application), compression, and elevation (RICE). Remove the injured athlete from play, apply ice to the affected area, wrap or compress the injured area with an elastic bandage when appropriate, and elevate the injured area above heart level to reduce swelling.  The patient is to not use ice for longer than 20 minutes at a time, with at least 20 minutes of no ice usage between applications.     Patient instructed use OTC analgesics with dosing per package insert as needed.      The patient states understanding and agreement with this plan.    Patient is discharged from the surgery.  The patient states understanding and agreement with this plan.    An After  Visit Summary was printed and given to the patient at discharge, including (if requested) any available informative/educational handouts regarding diagnosis, treatment, or medications. All questions were answered to patient/family satisfaction. Should symptoms fail to improve or worsen they agree to call or return to clinic or to go to the Emergency Department. Discussed the importance of following up with any needed screening tests/labs/specialist appointments and any requested follow-up recommended by me today. Importance of maintaining follow-up discussed and patient accepts that missed appointments can delay diagnosis and potentially lead to worsening of conditions.    Return if symptoms worsen or fail to improve., or sooner if acute issues arise.    This document has been electronically signed by Meir Mcneill DPM on October 1, 2024 10:31 EDT

## 2024-10-03 ENCOUNTER — TRANSCRIBE ORDERS (OUTPATIENT)
Dept: ADMINISTRATIVE | Facility: HOSPITAL | Age: 54
End: 2024-10-03
Payer: COMMERCIAL

## 2024-10-03 DIAGNOSIS — Z78.0 POST-MENOPAUSAL: Primary | ICD-10-CM

## 2024-10-17 DIAGNOSIS — M20.5X1 HALLUX LIMITUS OF RIGHT FOOT: Primary | ICD-10-CM

## 2024-11-12 ENCOUNTER — HOSPITAL ENCOUNTER (OUTPATIENT)
Dept: MAMMOGRAPHY | Facility: HOSPITAL | Age: 54
Discharge: HOME | End: 2024-11-12
Payer: COMMERCIAL

## 2024-11-12 ENCOUNTER — HOSPITAL ENCOUNTER (OUTPATIENT)
Dept: BONE DENSITY | Facility: HOSPITAL | Age: 54
Discharge: HOME | End: 2024-11-12
Payer: COMMERCIAL

## 2024-11-12 DIAGNOSIS — Z12.31 SCREENING MAMMOGRAM FOR BREAST CANCER: ICD-10-CM

## 2024-11-12 DIAGNOSIS — Z78.0 POST-MENOPAUSAL: ICD-10-CM

## 2024-11-12 PROCEDURE — 77080 DXA BONE DENSITY AXIAL: CPT

## 2024-11-12 PROCEDURE — 77063 BREAST TOMOSYNTHESIS BI: CPT

## 2024-11-12 PROCEDURE — 77067 SCR MAMMO BI INCL CAD: CPT

## 2024-12-02 ENCOUNTER — OFFICE VISIT (OUTPATIENT)
Dept: PODIATRY | Facility: CLINIC | Age: 54
End: 2024-12-02
Payer: COMMERCIAL

## 2024-12-02 VITALS
SYSTOLIC BLOOD PRESSURE: 107 MMHG | HEART RATE: 61 BPM | TEMPERATURE: 98.7 F | BODY MASS INDEX: 30.51 KG/M2 | OXYGEN SATURATION: 97 % | DIASTOLIC BLOOD PRESSURE: 76 MMHG | WEIGHT: 189 LBS

## 2024-12-02 DIAGNOSIS — M20.5X1 HALLUX LIMITUS OF RIGHT FOOT: Primary | ICD-10-CM

## 2024-12-02 DIAGNOSIS — M79.671 FOOT PAIN, RIGHT: ICD-10-CM

## 2024-12-02 PROCEDURE — 99214 OFFICE O/P EST MOD 30 MIN: CPT | Performed by: PODIATRIST

## 2024-12-02 NOTE — H&P (VIEW-ONLY)
[facility] - PODIATRY    Today's Date: 12/02/24    Patient Name: Meme Dunne  MRN: [id number]  CSN: [id number]  PCP: Woody Lynch MD  Referring Provider: No ref. provider found    SUBJECTIVE     Chief Complaint   Patient presents with   • Right Foot - Follow-up     Preop visit, surgery 12/27/24     HPI: Meme Dunne, a 54 y.o.female, presents to clinic.    New, Established, New Problem: Established    Location: Right first MPJ    Duration:  Greater than 1 year    Onset: Insidious    Nature: Sore, stiff, painful right first MPJ    Stable, worsening, improving: Slowly worsening    Aggravating factors:  Patient relates pain is aggravated by shoe gear and ambulation.      Previous Treatment: Change in shoe gear, change in activities, avoiding activities    Patient denies any fevers, chills, nausea, vomiting, shortness of breath, nor any other constitutional signs nor symptoms.    No other pedal complaints at this time.    Past Medical History:   Diagnosis Date   • Great toe pain, left    • Great toe pain, right    • TIA (transient ischemic attack)     24 years ago, no residual     Past Surgical History:   Procedure Laterality Date   • ANAL FISSURECTOMY     • APPENDECTOMY     • CAST APPLICATION Left 7/26/2024    Procedure: CAST APPLICATION LEG;  Surgeon: Meir Mcneill DPM;  Location: [facility] MAIN OR;  Service: Podiatry;  Laterality: Left;   • CHOLECYSTECTOMY     • COLONOSCOPY N/A 04/11/2022    Procedure: COLONOSCOPY;  Surgeon: William Luna MD;  Location: [facility] ENDOSCOPY;  Service: General;  Laterality: N/A;  NORMAL COLON   • FOOT SURGERY Right 2020   • HYSTERECTOMY  1999   • MTP JOINT FUSION Left 7/26/2024    Procedure: ARTHRODESIS METATARSALPHALANGEAL JOINT - Left 1st MPJ;  Surgeon: Meir Mcneill DPM;  Location: [facility] MAIN OR;  Service: Podiatry;  Laterality: Left;     Family History   Problem Relation Age of Onset   • Hypertension Father    • Breast cancer  "Maternal Aunt      Social History     Socioeconomic History   • Marital status:    Tobacco Use   • Smoking status: Never   • Smokeless tobacco: Never   Vaping Use   • Vaping status: Never Used   Substance and Sexual Activity   • Alcohol use: Never   • Drug use: Never   • Sexual activity: Defer     No Known Allergies  Current Outpatient Medications   Medication Sig Dispense Refill   • amphetamine-dextroamphetamine (ADDERALL) 15 MG tablet Take 1 tablet by mouth 2 (Two) Times a Day.     • HYDROcodone-acetaminophen (NORCO) 5-325 MG per tablet Take 1-2 tablets by mouth Every 4 (Four) Hours As Needed (Pain). (Patient not taking: Reported on 9/10/2024) 30 tablet 0   • promethazine (PHENERGAN) 12.5 MG tablet Take 1 tablet by mouth Every 8 (Eight) Hours As Needed for Nausea or Vomiting. (Patient not taking: Reported on 9/10/2024) 30 tablet 0   • SEMAGLUTIDE-WEIGHT MANAGEMENT SC Inject 20 Units under the skin into the appropriate area as directed. Every 15 days       No current facility-administered medications for this visit.     Review of Systems   Constitutional: Negative.    Musculoskeletal:         Right first MPJ   All other systems reviewed and are negative.      OBJECTIVE     Vitals:    12/02/24 0739   BP: 107/76   Pulse: 61   Temp: 98.7 °F (37.1 °C)   SpO2: 97%       No results found for: \"WBC\", \"RBC\", \"HGB\", \"HCT\", \"MCV\", \"MCH\", \"MCHC\", \"RDW\", \"RDWSD\", \"MPV\", \"PLT\", \"NEUTRORELPCT\", \"LYMPHORELPCT\", \"MONORELPCT\", \"EOSRELPCT\", \"BASORELPCT\", \"AUTOIGPER\", \"NEUTROABS\", \"LYMPHSABS\", \"MONOSABS\", \"EOSABS\", \"BASOSABS\", \"AUTOIGNUM\", \"NRBC\"      No results found for: \"GLUCOSE\", \"BUN\", \"CREATININE\", \"NA\", \"K\", \"CL\", \"CALCIUM\", \"PROTEINTOT\", \"ALBUMIN\", \"ALT\", \"AST\", \"ALKPHOS\", \"BILITOT\", \"GLOB\", \"AGRATIO\", \"BCR\", \"ANIONGAP\", \"EGFR\"    Patient seen in no apparent distress.      PHYSICAL EXAM:     Foot/Ankle Exam    GENERAL  Appearance:  appears stated age  Orientation:  AAOx3  Affect:  appropriate  Gait:  " unimpaired  Assistance:  independent  Right shoe gear: casual shoe  Left shoe gear: casual shoe    VASCULAR     Right Foot Vascularity   Normal vascular exam    Dorsalis pedis:  2+  Posterior tibial:  2+  Skin temperature:  warm  Edema grading:  None  CFT:  < 3 seconds  Pedal hair growth:  Present  Varicosities:  none     Left Foot Vascularity   Normal vascular exam    Dorsalis pedis:  2+  Posterior tibial:  2+  Skin temperature:  warm  Edema grading:  None  CFT:  < 3 seconds  Pedal hair growth:  Present  Varicosities:  none     NEUROLOGIC     Right Foot Neurologic   Normal sensation    Light touch sensation: normal  Vibratory sensation: normal  Hot/Cold sensation: normal  Protective Sensation using Taft-Chanell Monofilament:   Sites intact: 10  Sites tested: 10     Left Foot Neurologic   Normal sensation    Light touch sensation: normal  Vibratory sensation: normal  Hot/Cold sensation:  normal  Protective Sensation using Taft-Chanell Monofilament:   Sites intact: 10  Sites tested: 10    MUSCULOSKELETAL     Right Foot Musculoskeletal   Tenderness:  MTP 1 dorsal tenderness      MUSCLE STRENGTH     Right Foot Muscle Strength   Foot dorsiflexion:  4  Foot plantar flexion:  4  Foot inversion:  4  Foot eversion:  4     Left Foot Muscle Strength   Foot dorsiflexion:  4  Foot plantar flexion:  4  Foot inversion:  4  Foot eversion:  4    RANGE OF MOTION     Right Foot Range of Motion   Foot and ankle ROM within normal limits    1st MTP extension:  with pain  1st MTP flexion:  with pain     Left Foot Range of Motion   Foot and ankle ROM within normal limits    1st MTP extension: 0  1st MTP flexion: 0    DERMATOLOGIC      Right Foot Dermatologic   Skin  Right foot skin is intact.      Left Foot Dermatologic   Skin  Left foot skin is intact.       RADIOLOGY:      Narrative & Impression   IN-OFFICE IMAGING:       Standing, weightbearing, 3 view, AP, MO, Lateral, Right foot  Indication:  Foot Pain  Findings: Significant  first metatarsal degenerative changes.  Small inferior calcaneal enthesopathy.  No periosteal reactions nor osteolytic changes seen.  No occult fractures seen.  Comparison: No comparison views available.          ASSESSMENT/PLAN     Diagnoses and all orders for this visit:    1. Hallux limitus of right foot (Primary)  -     ceFAZolin (ANCEF) 2 g in sodium chloride 0.9 % 100 mL IVPB    2. Foot pain, right    Other orders  -     Follow Anesthesia Guidelines / Protocol; Future  -     Follow Anesthesia Guidelines / Protocol; Standing  -     Verify NPO Status; Standing    Comprehensive lower extremity examination and evaluation was performed.    Discussed findings and treatment plan including risks, benefits, and treatment options with patient in detail. Patient agreed with treatment plan.    Medications and allergies reviewed.  Reviewed available lab values along with other pertinent labs.  These were discussed with the patient.    Plan surgery: Right first metatarsal phalangeal arthrodesis; This surgery is planned for 27 December 2024.    The risks and benefits of the surgery were discussed with the patient.  This discussion included possible complications of requiring further surgery, possible delayed wound healing, further surgery requiring amputation of the foot or leg, and also included the complication of death.  All the patient's questions were answered to their satisfaction.  Patient states they would like to proceed with the procedure.    Discussed with the patient at length surgical versus nonsurgical options.  Explained to the patient in detail that surgical intervention is only indicated when the level of pain is such that it negatively affects her daily life.    It was explained to the patient that surgical interventions often times do not relieve all of the pain associated with the deformity    Risks and complications associated with surgical versus nonsurgical options were explained.  The patient  understands that the problem will most likely continue to evolve and surgical intervention is the only treatment plan that actually corrects the deformities.    Upon discussion of non-surgical conservative option, surgical correction, post-operative requirements along with risk and benefits of the surgery along with expected outcomes, the patient states they would like to proceed with the scheduling surgery.      The patient has decided to move forward with surgical intervention understanding all of these risks and complications.    An After Visit Summary was printed and given to the patient at discharge, including (if requested) any available informative/educational handouts regarding diagnosis, treatment, or medications. All questions were answered to patient/family satisfaction. Should symptoms fail to improve or worsen they agree to call or return to clinic or to go to the Emergency Department. Discussed the importance of following up with any needed screening tests/labs/specialist appointments and any requested follow-up recommended by me today. Importance of maintaining follow-up discussed and patient accepts that missed appointments can delay diagnosis and potentially lead to worsening of conditions.    Return in about 29 days (around 12/31/2024) for Post Operative, Cast change, X-ray needed., or sooner if acute issues arise.    This document has been electronically signed by eMir Mcneill DPM on December 2, 2024 08:04 EST

## 2024-12-02 NOTE — PROGRESS NOTES
[facility] - PODIATRY    Today's Date: 12/02/24    Patient Name: Meme Dunne  MRN: [id number]  CSN: [id number]  PCP: Woody Lynch MD  Referring Provider: No ref. provider found    SUBJECTIVE     Chief Complaint   Patient presents with    Right Foot - Follow-up     Preop visit, surgery 12/27/24     HPI: Meme Dunne, a 54 y.o.female, presents to clinic.    New, Established, New Problem: Established    Location: Right first MPJ    Duration:  Greater than 1 year    Onset: Insidious    Nature: Sore, stiff, painful right first MPJ    Stable, worsening, improving: Slowly worsening    Aggravating factors:  Patient relates pain is aggravated by shoe gear and ambulation.      Previous Treatment: Change in shoe gear, change in activities, avoiding activities    Patient denies any fevers, chills, nausea, vomiting, shortness of breath, nor any other constitutional signs nor symptoms.    No other pedal complaints at this time.    Past Medical History:   Diagnosis Date    Great toe pain, left     Great toe pain, right     TIA (transient ischemic attack)     24 years ago, no residual     Past Surgical History:   Procedure Laterality Date    ANAL FISSURECTOMY      APPENDECTOMY      CAST APPLICATION Left 7/26/2024    Procedure: CAST APPLICATION LEG;  Surgeon: Meir Mcneill DPM;  Location: [facility] MAIN OR;  Service: Podiatry;  Laterality: Left;    CHOLECYSTECTOMY      COLONOSCOPY N/A 04/11/2022    Procedure: COLONOSCOPY;  Surgeon: William Luna MD;  Location: [facility] ENDOSCOPY;  Service: General;  Laterality: N/A;  NORMAL COLON    FOOT SURGERY Right 2020    HYSTERECTOMY  1999    MTP JOINT FUSION Left 7/26/2024    Procedure: ARTHRODESIS METATARSALPHALANGEAL JOINT - Left 1st MPJ;  Surgeon: Meir Mcneill DPM;  Location: [facility] MAIN OR;  Service: Podiatry;  Laterality: Left;     Family History   Problem Relation Age of Onset    Hypertension Father     Breast cancer Maternal Aunt   "    Social History     Socioeconomic History    Marital status:    Tobacco Use    Smoking status: Never    Smokeless tobacco: Never   Vaping Use    Vaping status: Never Used   Substance and Sexual Activity    Alcohol use: Never    Drug use: Never    Sexual activity: Defer     No Known Allergies  Current Outpatient Medications   Medication Sig Dispense Refill    amphetamine-dextroamphetamine (ADDERALL) 15 MG tablet Take 1 tablet by mouth 2 (Two) Times a Day.      HYDROcodone-acetaminophen (NORCO) 5-325 MG per tablet Take 1-2 tablets by mouth Every 4 (Four) Hours As Needed (Pain). (Patient not taking: Reported on 9/10/2024) 30 tablet 0    promethazine (PHENERGAN) 12.5 MG tablet Take 1 tablet by mouth Every 8 (Eight) Hours As Needed for Nausea or Vomiting. (Patient not taking: Reported on 9/10/2024) 30 tablet 0    SEMAGLUTIDE-WEIGHT MANAGEMENT SC Inject 20 Units under the skin into the appropriate area as directed. Every 15 days       No current facility-administered medications for this visit.     Review of Systems   Constitutional: Negative.    Musculoskeletal:         Right first MPJ   All other systems reviewed and are negative.      OBJECTIVE     Vitals:    12/02/24 0739   BP: 107/76   Pulse: 61   Temp: 98.7 °F (37.1 °C)   SpO2: 97%       No results found for: \"WBC\", \"RBC\", \"HGB\", \"HCT\", \"MCV\", \"MCH\", \"MCHC\", \"RDW\", \"RDWSD\", \"MPV\", \"PLT\", \"NEUTRORELPCT\", \"LYMPHORELPCT\", \"MONORELPCT\", \"EOSRELPCT\", \"BASORELPCT\", \"AUTOIGPER\", \"NEUTROABS\", \"LYMPHSABS\", \"MONOSABS\", \"EOSABS\", \"BASOSABS\", \"AUTOIGNUM\", \"NRBC\"      No results found for: \"GLUCOSE\", \"BUN\", \"CREATININE\", \"NA\", \"K\", \"CL\", \"CALCIUM\", \"PROTEINTOT\", \"ALBUMIN\", \"ALT\", \"AST\", \"ALKPHOS\", \"BILITOT\", \"GLOB\", \"AGRATIO\", \"BCR\", \"ANIONGAP\", \"EGFR\"    Patient seen in no apparent distress.      PHYSICAL EXAM:     Foot/Ankle Exam    GENERAL  Appearance:  appears stated age  Orientation:  AAOx3  Affect:  appropriate  Gait:  unimpaired  Assistance:  independent  Right " shoe gear: casual shoe  Left shoe gear: casual shoe    VASCULAR     Right Foot Vascularity   Normal vascular exam    Dorsalis pedis:  2+  Posterior tibial:  2+  Skin temperature:  warm  Edema grading:  None  CFT:  < 3 seconds  Pedal hair growth:  Present  Varicosities:  none     Left Foot Vascularity   Normal vascular exam    Dorsalis pedis:  2+  Posterior tibial:  2+  Skin temperature:  warm  Edema grading:  None  CFT:  < 3 seconds  Pedal hair growth:  Present  Varicosities:  none     NEUROLOGIC     Right Foot Neurologic   Normal sensation    Light touch sensation: normal  Vibratory sensation: normal  Hot/Cold sensation: normal  Protective Sensation using Opdyke-Chanell Monofilament:   Sites intact: 10  Sites tested: 10     Left Foot Neurologic   Normal sensation    Light touch sensation: normal  Vibratory sensation: normal  Hot/Cold sensation:  normal  Protective Sensation using Opdyke-Chanell Monofilament:   Sites intact: 10  Sites tested: 10    MUSCULOSKELETAL     Right Foot Musculoskeletal   Tenderness:  MTP 1 dorsal tenderness      MUSCLE STRENGTH     Right Foot Muscle Strength   Foot dorsiflexion:  4  Foot plantar flexion:  4  Foot inversion:  4  Foot eversion:  4     Left Foot Muscle Strength   Foot dorsiflexion:  4  Foot plantar flexion:  4  Foot inversion:  4  Foot eversion:  4    RANGE OF MOTION     Right Foot Range of Motion   Foot and ankle ROM within normal limits    1st MTP extension:  with pain  1st MTP flexion:  with pain     Left Foot Range of Motion   Foot and ankle ROM within normal limits    1st MTP extension: 0  1st MTP flexion: 0    DERMATOLOGIC      Right Foot Dermatologic   Skin  Right foot skin is intact.      Left Foot Dermatologic   Skin  Left foot skin is intact.       RADIOLOGY:      Narrative & Impression   IN-OFFICE IMAGING:       Standing, weightbearing, 3 view, AP, MO, Lateral, Right foot  Indication:  Foot Pain  Findings: Significant first metatarsal degenerative changes.   Small inferior calcaneal enthesopathy.  No periosteal reactions nor osteolytic changes seen.  No occult fractures seen.  Comparison: No comparison views available.          ASSESSMENT/PLAN     Diagnoses and all orders for this visit:    1. Hallux limitus of right foot (Primary)  -     ceFAZolin (ANCEF) 2 g in sodium chloride 0.9 % 100 mL IVPB    2. Foot pain, right    Other orders  -     Follow Anesthesia Guidelines / Protocol; Future  -     Follow Anesthesia Guidelines / Protocol; Standing  -     Verify NPO Status; Standing    Comprehensive lower extremity examination and evaluation was performed.    Discussed findings and treatment plan including risks, benefits, and treatment options with patient in detail. Patient agreed with treatment plan.    Medications and allergies reviewed.  Reviewed available lab values along with other pertinent labs.  These were discussed with the patient.    Plan surgery: Right first metatarsal phalangeal arthrodesis; This surgery is planned for 27 December 2024.    The risks and benefits of the surgery were discussed with the patient.  This discussion included possible complications of requiring further surgery, possible delayed wound healing, further surgery requiring amputation of the foot or leg, and also included the complication of death.  All the patient's questions were answered to their satisfaction.  Patient states they would like to proceed with the procedure.    Discussed with the patient at length surgical versus nonsurgical options.  Explained to the patient in detail that surgical intervention is only indicated when the level of pain is such that it negatively affects her daily life.    It was explained to the patient that surgical interventions often times do not relieve all of the pain associated with the deformity    Risks and complications associated with surgical versus nonsurgical options were explained.  The patient understands that the problem will most likely  continue to evolve and surgical intervention is the only treatment plan that actually corrects the deformities.    Upon discussion of non-surgical conservative option, surgical correction, post-operative requirements along with risk and benefits of the surgery along with expected outcomes, the patient states they would like to proceed with the scheduling surgery.      The patient has decided to move forward with surgical intervention understanding all of these risks and complications.    An After Visit Summary was printed and given to the patient at discharge, including (if requested) any available informative/educational handouts regarding diagnosis, treatment, or medications. All questions were answered to patient/family satisfaction. Should symptoms fail to improve or worsen they agree to call or return to clinic or to go to the Emergency Department. Discussed the importance of following up with any needed screening tests/labs/specialist appointments and any requested follow-up recommended by me today. Importance of maintaining follow-up discussed and patient accepts that missed appointments can delay diagnosis and potentially lead to worsening of conditions.    Return in about 29 days (around 12/31/2024) for Post Operative, Cast change, X-ray needed., or sooner if acute issues arise.    This document has been electronically signed by Meir Mcneill DPM on December 2, 2024 08:04 EST

## 2024-12-23 NOTE — PAT
SARA FROM DR. JEFFREY OFFICE CALLED AND STATED LAST DOSE OF GLP1 WAS 19TH.  PATIENT GAVE WRONG DATE INITIALLY.  SARA TO INSTRUCTED PATIENT TO HOLD UPCOMING DOSE AND TAKE AFTER SURGERY COMPLETE.

## 2024-12-23 NOTE — PRE-PROCEDURE INSTRUCTIONS
IMPORTANT INSTRUCTIONS - PRE-ADMISSION TESTING  DO NOT EAT, DRINK OR CHEW anything after midnight the night before your procedure.    Take the following medications the morning of your procedure with JUST A SIP OF WATER: NONE  INSTRUCTED TO HOLD SEMAGLUTIDE INJECTION AT LEAST 7 DAYS PREOP    DO NOT BRING your medications to the hospital with you, UNLESS something has changed since your PRE-Admission Testing appointment.  Hold all vitamins, supplements, and NSAIDS (Non- steroidal anti-inflammatory meds) for one week prior to surgery (you MAY take Tylenol or Acetaminophen).  If you are diabetic, check your blood sugar the morning of your procedure. If it is less than 70 or if you are feeling symptomatic, call the following number for further instructions: [phone] SAME DAY SURGERY_.  Use your inhalers/nebulizers as usual, the morning of your procedure. BRING YOUR INHALERS with you.   Bring your CPAP or BIPAP to hospital, ONLY IF YOU WILL BE SPENDING THE NIGHT.   Make sure you have a ride home and have someone who will stay with you the day of your procedure after you go home.  If you have any questions, please call your Pre-Admission Testing NurseANNEL_ at [phone]_.   Per anesthesia request, do not smoke for 24 hours before your procedure or as instructed by your surgeon.   SHOWER WITH PRESURGICAL SOAP AS INSTRUCTED BY DR. JEFFREY'S OFFICE. AFTERWARDS NO CREAM, LOTION, JEWELRY, MAKEUP OR POLISH ON SKIN.

## 2024-12-27 ENCOUNTER — ANESTHESIA (OUTPATIENT)
Dept: PERIOP | Facility: HOSPITAL | Age: 54
End: 2024-12-27
Payer: COMMERCIAL

## 2024-12-27 ENCOUNTER — HOSPITAL ENCOUNTER (OUTPATIENT)
Facility: HOSPITAL | Age: 54
Setting detail: HOSPITAL OUTPATIENT SURGERY
Discharge: HOME | End: 2024-12-27
Attending: PODIATRIST | Admitting: PODIATRIST
Payer: COMMERCIAL

## 2024-12-27 ENCOUNTER — ANESTHESIA EVENT (OUTPATIENT)
Dept: PERIOP | Facility: HOSPITAL | Age: 54
End: 2024-12-27
Payer: COMMERCIAL

## 2024-12-27 VITALS
OXYGEN SATURATION: 99 % | TEMPERATURE: 97.2 F | BODY MASS INDEX: 29.3 KG/M2 | DIASTOLIC BLOOD PRESSURE: 75 MMHG | SYSTOLIC BLOOD PRESSURE: 120 MMHG | HEART RATE: 71 BPM | RESPIRATION RATE: 14 BRPM | HEIGHT: 68 IN | WEIGHT: 193.34 LBS

## 2024-12-27 DIAGNOSIS — M20.5X1 HALLUX LIMITUS OF RIGHT FOOT: ICD-10-CM

## 2024-12-27 PROCEDURE — 25010000002 BUPIVACAINE (PF) 0.25 % SOLUTION: Performed by: PODIATRIST

## 2024-12-27 PROCEDURE — 25010000002 PROPOFOL 10 MG/ML EMULSION: Performed by: NURSE ANESTHETIST, CERTIFIED REGISTERED

## 2024-12-27 PROCEDURE — 25010000002 PROCHLORPERAZINE 10 MG/2ML SOLUTION: Performed by: ANESTHESIOLOGY

## 2024-12-27 PROCEDURE — 25010000002 FENTANYL CITRATE (PF) 50 MCG/ML SOLUTION: Performed by: NURSE ANESTHETIST, CERTIFIED REGISTERED

## 2024-12-27 PROCEDURE — 25010000002 DEXAMETHASONE PER 1 MG: Performed by: NURSE ANESTHETIST, CERTIFIED REGISTERED

## 2024-12-27 PROCEDURE — 25010000002 CEFAZOLIN PER 500 MG: Performed by: PODIATRIST

## 2024-12-27 PROCEDURE — 25010000002 ONDANSETRON PER 1 MG: Performed by: NURSE ANESTHETIST, CERTIFIED REGISTERED

## 2024-12-27 PROCEDURE — 25010000002 MIDAZOLAM PER 1MG: Performed by: ANESTHESIOLOGY

## 2024-12-27 PROCEDURE — 28750 FUSION OF BIG TOE JOINT: CPT | Performed by: PODIATRIST

## 2024-12-27 PROCEDURE — 25010000002 LIDOCAINE PF 2% 2 % SOLUTION: Performed by: NURSE ANESTHETIST, CERTIFIED REGISTERED

## 2024-12-27 PROCEDURE — 25010000002 KETOROLAC TROMETHAMINE PER 15 MG: Performed by: NURSE ANESTHETIST, CERTIFIED REGISTERED

## 2024-12-27 PROCEDURE — C1713 ANCHOR/SCREW BN/BN,TIS/BN: HCPCS | Performed by: PODIATRIST

## 2024-12-27 PROCEDURE — 25810000003 LACTATED RINGERS PER 1000 ML: Performed by: ANESTHESIOLOGY

## 2024-12-27 DEVICE — P28, K-WIRE, 1.6 X 150 MM, SINGLE TROCAR, SMOOTH, SS
Type: IMPLANTABLE DEVICE | Site: TOE FIRST | Status: FUNCTIONAL
Brand: MULTI SYSTEM

## 2024-12-27 DEVICE — JAWS STAPLE, 12MM, 12 X 12 STRAIGHT
Type: IMPLANTABLE DEVICE | Site: TOE FIRST | Status: FUNCTIONAL
Brand: JAWS STAPLE SYSTEM

## 2024-12-27 RX ORDER — MIDAZOLAM HYDROCHLORIDE 2 MG/2ML
2 INJECTION, SOLUTION INTRAMUSCULAR; INTRAVENOUS ONCE
Status: COMPLETED | OUTPATIENT
Start: 2024-12-27 | End: 2024-12-27

## 2024-12-27 RX ORDER — MAGNESIUM HYDROXIDE 1200 MG/15ML
LIQUID ORAL AS NEEDED
Status: DISCONTINUED | OUTPATIENT
Start: 2024-12-27 | End: 2024-12-27 | Stop reason: HOSPADM

## 2024-12-27 RX ORDER — KETOROLAC TROMETHAMINE 30 MG/ML
INJECTION, SOLUTION INTRAMUSCULAR; INTRAVENOUS AS NEEDED
Status: DISCONTINUED | OUTPATIENT
Start: 2024-12-27 | End: 2024-12-27 | Stop reason: SURG

## 2024-12-27 RX ORDER — ONDANSETRON 2 MG/ML
INJECTION INTRAMUSCULAR; INTRAVENOUS AS NEEDED
Status: DISCONTINUED | OUTPATIENT
Start: 2024-12-27 | End: 2024-12-27 | Stop reason: SURG

## 2024-12-27 RX ORDER — PROMETHAZINE HYDROCHLORIDE 12.5 MG/1
25 TABLET ORAL ONCE AS NEEDED
Status: DISCONTINUED | OUTPATIENT
Start: 2024-12-27 | End: 2024-12-27 | Stop reason: HOSPADM

## 2024-12-27 RX ORDER — ACETAMINOPHEN 500 MG
1000 TABLET ORAL ONCE
Status: COMPLETED | OUTPATIENT
Start: 2024-12-27 | End: 2024-12-27

## 2024-12-27 RX ORDER — DEXAMETHASONE SODIUM PHOSPHATE 4 MG/ML
INJECTION, SOLUTION INTRA-ARTICULAR; INTRALESIONAL; INTRAMUSCULAR; INTRAVENOUS; SOFT TISSUE AS NEEDED
Status: DISCONTINUED | OUTPATIENT
Start: 2024-12-27 | End: 2024-12-27 | Stop reason: SURG

## 2024-12-27 RX ORDER — FENTANYL CITRATE 50 UG/ML
INJECTION, SOLUTION INTRAMUSCULAR; INTRAVENOUS AS NEEDED
Status: DISCONTINUED | OUTPATIENT
Start: 2024-12-27 | End: 2024-12-27 | Stop reason: SURG

## 2024-12-27 RX ORDER — BUPIVACAINE HYDROCHLORIDE 2.5 MG/ML
INJECTION, SOLUTION EPIDURAL; INFILTRATION; INTRACAUDAL AS NEEDED
Status: DISCONTINUED | OUTPATIENT
Start: 2024-12-27 | End: 2024-12-27 | Stop reason: HOSPADM

## 2024-12-27 RX ORDER — PROMETHAZINE HYDROCHLORIDE 12.5 MG/1
12.5 TABLET ORAL EVERY 8 HOURS PRN
Qty: 30 TABLET | Refills: 0 | Status: SHIPPED | OUTPATIENT
Start: 2024-12-27

## 2024-12-27 RX ORDER — HYDROCODONE BITARTRATE AND ACETAMINOPHEN 5; 325 MG/1; MG/1
1-2 TABLET ORAL EVERY 4 HOURS PRN
Qty: 30 TABLET | Refills: 0 | Status: SHIPPED | OUTPATIENT
Start: 2024-12-27

## 2024-12-27 RX ORDER — ONDANSETRON 2 MG/ML
4 INJECTION INTRAMUSCULAR; INTRAVENOUS ONCE AS NEEDED
Status: DISCONTINUED | OUTPATIENT
Start: 2024-12-27 | End: 2024-12-27 | Stop reason: HOSPADM

## 2024-12-27 RX ORDER — OXYCODONE HYDROCHLORIDE 5 MG/1
5 TABLET ORAL
Status: DISCONTINUED | OUTPATIENT
Start: 2024-12-27 | End: 2024-12-27 | Stop reason: HOSPADM

## 2024-12-27 RX ORDER — PROMETHAZINE HYDROCHLORIDE 25 MG/1
25 SUPPOSITORY RECTAL ONCE AS NEEDED
Status: DISCONTINUED | OUTPATIENT
Start: 2024-12-27 | End: 2024-12-27 | Stop reason: HOSPADM

## 2024-12-27 RX ORDER — PROPOFOL 10 MG/ML
VIAL (ML) INTRAVENOUS AS NEEDED
Status: DISCONTINUED | OUTPATIENT
Start: 2024-12-27 | End: 2024-12-27 | Stop reason: SURG

## 2024-12-27 RX ORDER — LIDOCAINE HYDROCHLORIDE 20 MG/ML
INJECTION, SOLUTION EPIDURAL; INFILTRATION; INTRACAUDAL; PERINEURAL AS NEEDED
Status: DISCONTINUED | OUTPATIENT
Start: 2024-12-27 | End: 2024-12-27 | Stop reason: SURG

## 2024-12-27 RX ORDER — SODIUM CHLORIDE, SODIUM LACTATE, POTASSIUM CHLORIDE, CALCIUM CHLORIDE 600; 310; 30; 20 MG/100ML; MG/100ML; MG/100ML; MG/100ML
20 INJECTION, SOLUTION INTRAVENOUS CONTINUOUS PRN
Status: DISCONTINUED | OUTPATIENT
Start: 2024-12-27 | End: 2024-12-27 | Stop reason: HOSPADM

## 2024-12-27 RX ORDER — MEPERIDINE HYDROCHLORIDE 25 MG/ML
12.5 INJECTION INTRAMUSCULAR; INTRAVENOUS; SUBCUTANEOUS
Status: DISCONTINUED | OUTPATIENT
Start: 2024-12-27 | End: 2024-12-27 | Stop reason: HOSPADM

## 2024-12-27 RX ORDER — PROCHLORPERAZINE EDISYLATE 5 MG/ML
10 INJECTION INTRAMUSCULAR; INTRAVENOUS ONCE
Status: COMPLETED | OUTPATIENT
Start: 2024-12-27 | End: 2024-12-27

## 2024-12-27 RX ORDER — PHENYLEPHRINE HCL IN 0.9% NACL 0.5 MG/5ML
SYRINGE (ML) INTRAVENOUS AS NEEDED
Status: DISCONTINUED | OUTPATIENT
Start: 2024-12-27 | End: 2024-12-27 | Stop reason: SURG

## 2024-12-27 RX ADMIN — DEXAMETHASONE SODIUM PHOSPHATE 8 MG: 4 INJECTION, SOLUTION INTRAMUSCULAR; INTRAVENOUS at 13:33

## 2024-12-27 RX ADMIN — Medication 200 MCG: at 14:43

## 2024-12-27 RX ADMIN — Medication 200 MCG: at 14:18

## 2024-12-27 RX ADMIN — KETOROLAC TROMETHAMINE 30 MG: 30 INJECTION, SOLUTION INTRAMUSCULAR; INTRAVENOUS at 14:26

## 2024-12-27 RX ADMIN — PROPOFOL 150 MG: 10 INJECTION, EMULSION INTRAVENOUS at 13:38

## 2024-12-27 RX ADMIN — SODIUM CHLORIDE 2 G: 9 INJECTION, SOLUTION INTRAVENOUS at 13:32

## 2024-12-27 RX ADMIN — MIDAZOLAM HYDROCHLORIDE 2 MG: 1 INJECTION, SOLUTION INTRAMUSCULAR; INTRAVENOUS at 12:48

## 2024-12-27 RX ADMIN — FENTANYL CITRATE 50 MCG: 50 INJECTION, SOLUTION INTRAMUSCULAR; INTRAVENOUS at 13:48

## 2024-12-27 RX ADMIN — Medication 200 MCG: at 14:08

## 2024-12-27 RX ADMIN — PROPOFOL 50 MG: 10 INJECTION, EMULSION INTRAVENOUS at 13:46

## 2024-12-27 RX ADMIN — LIDOCAINE HYDROCHLORIDE 60 MG: 20 INJECTION, SOLUTION INTRAVENOUS at 13:38

## 2024-12-27 RX ADMIN — ONDANSETRON 4 MG: 2 INJECTION INTRAMUSCULAR; INTRAVENOUS at 13:33

## 2024-12-27 RX ADMIN — SODIUM CHLORIDE, POTASSIUM CHLORIDE, SODIUM LACTATE AND CALCIUM CHLORIDE: 600; 310; 30; 20 INJECTION, SOLUTION INTRAVENOUS at 14:25

## 2024-12-27 RX ADMIN — SODIUM CHLORIDE, POTASSIUM CHLORIDE, SODIUM LACTATE AND CALCIUM CHLORIDE 20 ML/HR: 600; 310; 30; 20 INJECTION, SOLUTION INTRAVENOUS at 12:09

## 2024-12-27 RX ADMIN — Medication 200 MCG: at 14:36

## 2024-12-27 RX ADMIN — FENTANYL CITRATE 50 MCG: 50 INJECTION, SOLUTION INTRAMUSCULAR; INTRAVENOUS at 13:42

## 2024-12-27 RX ADMIN — Medication 200 MCG: at 14:14

## 2024-12-27 RX ADMIN — PROCHLORPERAZINE EDISYLATE 10 MG: 5 INJECTION INTRAMUSCULAR; INTRAVENOUS at 12:49

## 2024-12-27 RX ADMIN — Medication 200 MCG: at 14:25

## 2024-12-27 RX ADMIN — ACETAMINOPHEN 1000 MG: 500 TABLET ORAL at 12:10

## 2024-12-27 NOTE — DISCHARGE INSTRUCTIONS
DISCHARGE INSTRUCTIONS  PODIATRY SURGERY       For your surgery you had:  General anesthesia (you may have a sore throat for the first 24 hours)  Local anesthesia  You may experience dizziness, drowsiness, or light-headedness for several hours following surgery  Do not stay alone today or tonight.  Limit your activity for 24 hours.  Resume your diet slowly.  Follow whatever special dietary instructions you may have been given by the doctor.  You should not drive or operate machinery or drink alcohol for 24 hours or while you are taking pain medication.You should not sign any legally binding documents.  Sleep with the injured part elevated on a pillow.  Follow verbal instructions of your doctor.  Sit with the lower leg propped up on a footstool or chair with pillows.    SPECIAL INSTRUCTIONS:  Take surgical shoe to follow-up appointment.    Last dose of pain medication was given at:  Tylenol 1000 mg given at 12:12. Do not exceed 4000 mg in a 24 hour period.  Toradol given at 2:26. May have ibuprofen at 8:26 pm.    Ice bag to injured area for 72 hours.  Apply 20 minutes on - 20 minutes off.  Never place ice directly on skin or cast.     Bend knee and rotate ankle for 5 minutes every hour to support circulation.  DO NOT REMOVE DRESSING. KEEP DRESSING DRY. You may try to bathe in a tub, while keeping foot out of tub.  Small amount of bleeding through bandage may occur and is normal, unless it becomes heavy or persists, call office in this case.  Eat well balanced diet high in protein and vitamin c, may take supplemental vitamins and calcium. Drink plenty of fluids and get plenty of rest with foot elevated.  Use crutches, walker or cane for ambulation depending on weight bearing status. No driving until released by MD.      NOTIFY THE PHYSICIAN IF YOU EXPERIENCE:  Numbness of toes.  Inability to move toes.  Extreme coldness, paleness or blue dis-coloration of toes.  Excessive swelling of affected surgical site or  swelling that causes the cast to rub or cut into skin.  Pain unrelieved by pain medication  Nausea/vomiting not relieved by prescribed medication  Unable to urinate in 6 hours after surgery  Temperature greater than 101 degree Fahrenheit or chills  If unable to reach your doctor, please go to the closest emergency room                             [facility] Post-Surgical Instructions    Go directly home and try to keep your feet elevated by sitting in the back seat of the car and placing your foot on the seat. Have your prescriptions filled immediately.    Elevate feet above the level of the heart by supporting your feet and legs with pillows. Lying on a couch with 3-4 pillows works well. Elevation helps reduce swelling and should be used whenever you are resting.    Place an ice bag covered with a towel on your ankle area and/or behind your knee for no longer than 20 minutes, then keep ice off of foot for at least 20 minutes. The best way to remember this is 20 minutes on, then 20 minutes off. Continue this for the first week while awake. Ice helps to reduce swelling and inflammation. Do NOT sleep with ice on your foot or leg.    To promote circulation and healing, bend your knee and rotate your foot and ankle for 5 minutes each hour. Dangle your feet down for 1-2 minutes at least once per hour, then continue to elevate.    Keep the bandages or cast clean and dry. Do NOT remove your bandages under any circumstances without consulting Dr. Mcneill. You may bathe by hanging your foot outside of the tub, but DO NOT attempt to shower.    Take your pain medication only as directed. Avoid alcoholwhile taking medication. If you experience nausea or lightheadedness, remain lying down and contact the office. You may prefer to use aspirin Tylenol, Motrin or other over the counter pain reliever.    A small amount of bleeding through the bandage may occur and should not cause concern unless it becomes heavy  or persists. If this happens, contact the office. Do not become alarmed if you notice a bruised appearance to your feet or toes.    Eat a well-balanced diet high in protein and vitamin C. Take supplemental vitamins and calcium. Drink plenty of fluids and get plenty of rest with your feet elevated.    Blankets may be kept from irritating the surgical site by using a large cardboard box to cradle the covers over the feet.    Use of crutches, a walker, or a cane can be useful in public areas to protect your feet. Do not attempt to operate a motor vehicle until directed by Dr. Mcneill.    Call the office if any of the following occur: bleeding that won't stop, injury to foot, fever, wet bandages, redness with throbbing, and pain unrelieved by medication.

## 2024-12-27 NOTE — ANESTHESIA PREPROCEDURE EVALUATION
Anesthesia Evaluation     Patient summary reviewed and Nursing notes reviewed   no history of anesthetic complications:   NPO Solid Status: > 8 hours  NPO Liquid Status: > 2 hours           Airway   Mallampati: I  TM distance: >3 FB  Neck ROM: full  No difficulty expected  Dental      Pulmonary - negative pulmonary ROS and normal exam    breath sounds clear to auscultation  Cardiovascular - negative cardio ROS and normal exam  Exercise tolerance: good (4-7 METS)    Rhythm: regular  Rate: normal        Neuro/Psych  (+) TIA  GI/Hepatic/Renal/Endo - negative ROS     Musculoskeletal (-) negative ROS    Abdominal    Substance History - negative use     OB/GYN negative ob/gyn ROS         Other - negative ROS       ROS/Med Hx Other: PAT Nursing Notes unavailable.     Last semiglutide was 12/19/2024                    Anesthesia Plan    ASA 2     general     (Patient understands anesthesia not responsible for dental damage.)  intravenous induction     Anesthetic plan, risks, benefits, and alternatives have been provided, discussed and informed consent has been obtained with: patient.    Use of blood products discussed with patient .    Plan discussed with CRNA.        CODE STATUS:

## 2024-12-27 NOTE — OP NOTE
Pre-Operative Diagnosis:  Right Hallux Rigidus    Post-Operative Diagnosis:  Same as pre-op diagnosis    Surgeon  Charito    Anesthesia  [facility] [place]    Procedure Performed/Technique  Arthrodesis of Right Hallux MPJ with Casting    Implants:  [place] Bone Staple x 2    Specimen/Tissue Removed:  None    Findings:    DJD in 1st MPJ    Complications:  No    Estimated Blood Loss:  None    DESCRIPTION OF PROCEDURE:  Written consent was obtained from the patient prior to any medications or sedation being administered.     Under mild sedation, the patient was brought to the operating room and placed on the operating table in the supine position.  A well padded calf tourniquet  was placed about the patient's right calf. Following IV anesthesia, local anesthesia was obtained around the right first and second rays utilizing a  total of 20 mL of 0.25% Marcaine plain.  The foot was then scrubbed, prepped and draped in the usual aseptic manner.  An Esmarch bandage was utilized to  exsanguinate the patient's right foot with a total pressure of 250 mmHg.     Attention was directed to the right first metatarsal phalangeal joint where a 6 cm linear and longitudinal incision was made in the dorsal medial aspect  first metatarsal phalangeal joint. An incision was made parallel to the first metatarsal phalangeal joint and involved the contour of the deformity. The  incision was deepened through subcutaneous tissues using sharp and blunt dissection. Care was taken to identify and retract all vital and  neurovascular structures.  All bleeders were ligated and cauterized as necessary.     At this time, a linear capsulotomy was performed over the metatarsal phalangeal joint.  The periosteal and capsule structures were then carefully dissected free of their osseous attachments and reflected dorsally and plantarly, thus exposing the head of the first metatarsal site.     Next, utilizing a cup and cone metatarsal head reamer, the contour surface of  the head of the metatarsal phalangeal joint and the base of the proximal phalanx were  resected.     Using standard AO fixation, two [place] 28 bone staples were utilized for arthrodesis of the first metatarsal phalangeal joint.  Arthrodesis was performed with the toe in approximately 15 degrees dorsiflexion of the metatarsal phalangeal joint.  Upon completion of the procedure, the position of the great toe was assessed and noted to be in good position.     The wound was flushed with copious amounts of sterile normal saline.     The capsular structures were reapproximated and coapted utilizing 3-0 Vicryl.  The skin edges were approximated and coapted utilizing 4-0 Monocryl.     Upon completion of the procedure, the tourniquet was deflated with prompt hyperemic response seen to toes one through five on the right foot.       The surgical site was dressed with a sterile compressive dressings consisting of Adaptic, 4 x 4's, Kerlix and Coban.     A posterior splint was then applied with the right foot rectus at 90 degrees at the ankle.     The patient tolerated the procedure and anesthesia well. The patient was transferred to the recovery room with vital signs stable and vascular status intact to toes one through five on the right foot.  Following a period of postoperative monitoring, the patient will be discharged home, having been given written and oral postoperative instructions. The patient is to contact Dr. Mcneill for postoperative followup care and if any problems should arise.

## 2024-12-27 NOTE — ANESTHESIA POSTPROCEDURE EVALUATION
Patient: Meme Dunne    Procedure Summary       Date: 12/27/24 Room / Location: [facility] OR 04 / [facility] MAIN OR    Anesthesia Start: 1332 Anesthesia Stop: 1500    Procedures:       ARTHRODESIS METATARSALPHALANGEAL JOINT (Right)      CAST APPLICATION LEG (Right) Diagnosis:       Hallux limitus of right foot      (Hallux limitus of right foot [M20.5X1])    Surgeons: Meir Mcneill DPM Provider: Jc Valdez CRNA    Anesthesia Type: general ASA Status: 2            Anesthesia Type: general    Vitals  Vitals Value Taken Time   /76 12/27/24 1527   Temp 36.1 °C (97 °F) 12/27/24 1530   Pulse 68 12/27/24 1530   Resp 12 12/27/24 1515   SpO2 96 % 12/27/24 1530           Post Anesthesia Care and Evaluation    Patient location during evaluation: bedside  Patient participation: complete - patient participated  Level of consciousness: awake  Pain management: adequate    Airway patency: patent  PONV Status: none  Cardiovascular status: acceptable and stable  Respiratory status: acceptable  Hydration status: acceptable

## 2024-12-31 ENCOUNTER — OFFICE VISIT (OUTPATIENT)
Dept: PODIATRY | Facility: CLINIC | Age: 54
End: 2024-12-31
Payer: COMMERCIAL

## 2024-12-31 ENCOUNTER — HOSPITAL ENCOUNTER (OUTPATIENT)
Dept: GENERAL RADIOLOGY | Facility: HOSPITAL | Age: 54
Discharge: HOME | End: 2024-12-31
Admitting: PODIATRIST
Payer: COMMERCIAL

## 2024-12-31 VITALS
HEART RATE: 87 BPM | TEMPERATURE: 97.5 F | SYSTOLIC BLOOD PRESSURE: 112 MMHG | BODY MASS INDEX: 29.4 KG/M2 | OXYGEN SATURATION: 97 % | HEIGHT: 68 IN | DIASTOLIC BLOOD PRESSURE: 76 MMHG

## 2024-12-31 DIAGNOSIS — M79.671 FOOT PAIN, RIGHT: ICD-10-CM

## 2024-12-31 DIAGNOSIS — M20.5X1 HALLUX LIMITUS OF RIGHT FOOT: Primary | ICD-10-CM

## 2024-12-31 PROCEDURE — 73630 X-RAY EXAM OF FOOT: CPT

## 2024-12-31 NOTE — PROGRESS NOTES
[facility] - PODIATRY    Today's Date: 12/31/24    Patient Name: Meme Dunne  MRN: [id number]  CSN: [id number]  PCP: Woody Lynch MD  Referring Provider: No ref. provider found    SUBJECTIVE     Chief Complaint   Patient presents with    Right Foot - Post-op Follow-up     Doing well except pain from splint around the ankle     HPI: Meme Dunne, a 54 y.o.female, presents to clinic.    Procedure: Arthrodesis of right first MPJ  Date: 27 December 2024    Patient states they are doing well without complications.  Patient states they are following post-op instructions.  Patient states pain is controlled.      Patient denies any fevers, chills, nausea, vomiting, shortness of breathe, nor any other constitutional signs nor symptoms.      Past Medical History:   Diagnosis Date    Great toe pain, left     Great toe pain, right     TIA (transient ischemic attack)     24 years ago, no residual     Past Surgical History:   Procedure Laterality Date    ANAL FISSURECTOMY      APPENDECTOMY      CAST APPLICATION Left 7/26/2024    Procedure: CAST APPLICATION LEG;  Surgeon: Meir Mcneill DPM;  Location: [facility] OR;  Service: Podiatry;  Laterality: Left;    CAST APPLICATION Right 12/27/2024    Procedure: CAST APPLICATION LEG;  Surgeon: Meir Mcneill DPM;  Location: [facility] MAIN OR;  Service: Podiatry;  Laterality: Right;    CHOLECYSTECTOMY      COLONOSCOPY N/A 04/11/2022    Procedure: COLONOSCOPY;  Surgeon: William Luna MD;  Location: [facility] ENDOSCOPY;  Service: General;  Laterality: N/A;  NORMAL COLON    FOOT SURGERY Right 2020    HYSTERECTOMY  1999    MTP JOINT FUSION Left 7/26/2024    Procedure: ARTHRODESIS METATARSALPHALANGEAL JOINT - Left 1st MPJ;  Surgeon: Meir Mcneill DPM;  Location: [facility] MAIN OR;  Service: Podiatry;  Laterality: Left;    MTP JOINT FUSION Right 12/27/2024    Procedure: ARTHRODESIS METATARSALPHALANGEAL JOINT;  Surgeon: Meir Mcneill  "LUCERO Salinas;  Location: [facility] OR;  Service: Podiatry;  Laterality: Right;     Family History   Problem Relation Age of Onset    Hypertension Father     Breast cancer Maternal Aunt      Social History     Socioeconomic History    Marital status:    Tobacco Use    Smoking status: Never    Smokeless tobacco: Never   Vaping Use    Vaping status: Never Used   Substance and Sexual Activity    Alcohol use: Never    Drug use: Never    Sexual activity: Defer     No Known Allergies  Current Outpatient Medications   Medication Sig Dispense Refill    amphetamine-dextroamphetamine (ADDERALL) 15 MG tablet Take 1 tablet by mouth 2 (Two) Times a Day.      HYDROcodone-acetaminophen (NORCO) 5-325 MG per tablet Take 1-2 tablets by mouth Every 4 (Four) Hours As Needed (Pain). 30 tablet 0    SEMAGLUTIDE-WEIGHT MANAGEMENT SC Inject 20 Units under the skin into the appropriate area as directed. Every 15 days      promethazine (PHENERGAN) 12.5 MG tablet Take 1 tablet by mouth Every 8 (Eight) Hours As Needed for Nausea or Vomiting. (Patient not taking: Reported on 12/31/2024) 30 tablet 0     No current facility-administered medications for this visit.     Review of Systems   Constitutional: Negative.    Musculoskeletal:         Right first MPJ   All other systems reviewed and are negative.      OBJECTIVE     There were no vitals filed for this visit.      No results found for: \"WBC\", \"RBC\", \"HGB\", \"HCT\", \"MCV\", \"MCH\", \"MCHC\", \"RDW\", \"RDWSD\", \"MPV\", \"PLT\", \"NEUTRORELPCT\", \"LYMPHORELPCT\", \"MONORELPCT\", \"EOSRELPCT\", \"BASORELPCT\", \"AUTOIGPER\", \"NEUTROABS\", \"LYMPHSABS\", \"MONOSABS\", \"EOSABS\", \"BASOSABS\", \"AUTOIGNUM\", \"NRBC\"      No results found for: \"GLUCOSE\", \"BUN\", \"CREATININE\", \"NA\", \"K\", \"CL\", \"CALCIUM\", \"PROTEINTOT\", \"ALBUMIN\", \"ALT\", \"AST\", \"ALKPHOS\", \"BILITOT\", \"GLOB\", \"AGRATIO\", \"BCR\", \"ANIONGAP\", \"EGFR\"    Patient seen in no apparent distress.      PHYSICAL EXAM:     Foot/Ankle Exam    GENERAL  Appearance:  appears stated " age  Orientation:  AAOx3  Affect:  appropriate  Assistance:  knee scooter  Left shoe gear: casual shoe    VASCULAR     Right Foot Vascularity   Normal vascular exam    Dorsalis pedis:  2+  Posterior tibial:  2+  Skin temperature:  warm  Edema grading:  None  CFT:  < 3 seconds  Pedal hair growth:  Present  Varicosities:  none     Left Foot Vascularity   Normal vascular exam    Dorsalis pedis:  2+  Posterior tibial:  2+  Skin temperature:  warm  Edema grading:  None  CFT:  < 3 seconds  Pedal hair growth:  Present  Varicosities:  none     NEUROLOGIC     Right Foot Neurologic   Normal sensation    Light touch sensation: normal  Vibratory sensation: normal  Hot/Cold sensation: normal  Protective Sensation using [place] Monofilament:   Sites intact: 10  Sites tested: 10     Left Foot Neurologic   Normal sensation    Light touch sensation: normal  Vibratory sensation: normal  Hot/Cold sensation:  normal  Protective Sensation using [place] Monofilament:   Sites intact: 10  Sites tested: 10    MUSCLE STRENGTH     Right Foot Muscle Strength   Foot dorsiflexion:  4  Foot plantar flexion:  4  Foot inversion:  4  Foot eversion:  4     Left Foot Muscle Strength   Foot dorsiflexion:  4  Foot plantar flexion:  4  Foot inversion:  4  Foot eversion:  4    RANGE OF MOTION     Right Foot Range of Motion   Foot and ankle ROM within normal limits       Left Foot Range of Motion   Foot and ankle ROM within normal limits    1st MTP extension: 0  1st MTP flexion: 0    DERMATOLOGIC      Right Foot Dermatologic   Skin  Right foot skin is intact.      Left Foot Dermatologic   Skin  Left foot skin is intact.      Right foot additional comments: Right foot shows posterior splint and dressing are dry and intact without signs of breakthrough.  First ray shows sutures intact with skin edges well-coapted with no signs of dehiscence.  Healthy surgical skin edges.  No drainage present.  No edema, erythema, calor, lymphangitis, nor  signs of infection seen.    Hallux in rectus position.    RADIOLOGY:      X-rays performed by radiologist transcription has not been performed yet.  Upon review by Dr. Mcneill, arthrodesis of the first MPJ is present well with bone staples for arthrodesis.  First ray is in rectus position.    ASSESSMENT/PLAN     Diagnoses and all orders for this visit:    1. Hallux limitus of right foot (Primary)    2. Foot pain, right    Comprehensive lower extremity examination and evaluation was performed.    Discussed findings and treatment plan including risks, benefits, and treatment options with patient in detail. Patient agreed with treatment plan.    Medications and allergies reviewed.  Reviewed available lab values along with other pertinent labs.  These were discussed with the patient.    Extremity:  Right leg, Short Leg cast.    Patient was placed in fiberglass cast today. The patient tolerated the procedure without any complications., Neurovascular status was evaluated post cast application and was within normal limits. The cast was not causing impingement on neuro-vasculature or vital structures.    Patient is to not weight bear to the affected foot at this time.  Patient states understanding and agreement with this plan.    Patient is to monitor for recurrence and any new symptoms and to contact Dr. Mcneill's office for a follow-up appointment.      The patient states understanding and agreement with this plan.    An After Visit Summary was printed and given to the patient at discharge, including (if requested) any available informative/educational handouts regarding diagnosis, treatment, or medications. All questions were answered to patient/family satisfaction. Should symptoms fail to improve or worsen they agree to call or return to clinic or to go to the Emergency Department. Discussed the importance of following up with any needed screening tests/labs/specialist appointments and any requested follow-up  recommended by me today. Importance of maintaining follow-up discussed and patient accepts that missed appointments can delay diagnosis and potentially lead to worsening of conditions.    Return in about 3 weeks (around 1/21/2025) for Cast change, X-ray needed., or sooner if acute issues arise.    This document has been electronically signed by Meir Mcneill DPM on December 31, 2024 08:59 EST

## 2025-01-16 ENCOUNTER — TELEPHONE (OUTPATIENT)
Dept: PODIATRY | Facility: CLINIC | Age: 55
End: 2025-01-16

## 2025-01-16 NOTE — TELEPHONE ENCOUNTER
DELETE AFTER REVIEWING: Send this encounter to the appropriate pool. See your Call Action Grid or Workflows for direction.    Caller: Meme Dunne    Relationship to patient: Self    Best call back number: [phone] (home) [phone] (work)      Chief complaint: RIGHT FOOT     Type of visit: CAST CHANGE W/XRAY POST OP     Requested date: PATIENT IS TRYING TO GET HER APPOINTMENT  TO 01/20/2025 DUE TO NEEDING A . HUB SCHEDULE DOES NOT HAVE THAT DATE AVAILABLE      If rescheduling, when is the original appointment: 01/21/2025 2.45      Additional notes:N A

## 2025-01-20 ENCOUNTER — OFFICE VISIT (OUTPATIENT)
Dept: PODIATRY | Facility: CLINIC | Age: 55
End: 2025-01-20
Payer: COMMERCIAL

## 2025-01-20 ENCOUNTER — HOSPITAL ENCOUNTER (OUTPATIENT)
Dept: GENERAL RADIOLOGY | Facility: HOSPITAL | Age: 55
Discharge: HOME | End: 2025-01-20
Admitting: PODIATRIST
Payer: COMMERCIAL

## 2025-01-20 VITALS
HEART RATE: 79 BPM | BODY MASS INDEX: 29.25 KG/M2 | WEIGHT: 193 LBS | TEMPERATURE: 96.1 F | DIASTOLIC BLOOD PRESSURE: 87 MMHG | OXYGEN SATURATION: 95 % | HEIGHT: 68 IN | SYSTOLIC BLOOD PRESSURE: 131 MMHG

## 2025-01-20 DIAGNOSIS — M79.671 FOOT PAIN, RIGHT: ICD-10-CM

## 2025-01-20 DIAGNOSIS — M20.5X1 HALLUX LIMITUS OF RIGHT FOOT: Primary | ICD-10-CM

## 2025-01-20 PROCEDURE — 73630 X-RAY EXAM OF FOOT: CPT

## 2025-01-20 NOTE — PROGRESS NOTES
[facility] - PODIATRY    Today's Date: 01/20/25    Patient Name: Meme Dunne  MRN: [id number]  CSN: [id number]  PCP: Woody Lynch MD  Referring Provider: No ref. provider found    SUBJECTIVE     Chief Complaint   Patient presents with    Right Foot - Post-op Follow-up     HPI: Meme Dunne, a 54 y.o.female, presents to clinic.    Procedure: Arthrodesis of right first MPJ  Date: 27 December 2024    Patient states they are doing well without complications.  Patient states they are following post-op instructions.  Patient states pain is controlled.      No recent medical changes.    Patient denies any fevers, chills, nausea, vomiting, shortness of breathe, nor any other constitutional signs nor symptoms.      Past Medical History:   Diagnosis Date    Great toe pain, left     Great toe pain, right     TIA (transient ischemic attack)     24 years ago, no residual     Past Surgical History:   Procedure Laterality Date    ANAL FISSURECTOMY      APPENDECTOMY      CAST APPLICATION Left 7/26/2024    Procedure: CAST APPLICATION LEG;  Surgeon: Meir Mcneill DPM;  Location: [facility] MAIN OR;  Service: Podiatry;  Laterality: Left;    CAST APPLICATION Right 12/27/2024    Procedure: CAST APPLICATION LEG;  Surgeon: Meir Mcneill DPM;  Location: [facility] MAIN OR;  Service: Podiatry;  Laterality: Right;    CHOLECYSTECTOMY      COLONOSCOPY N/A 04/11/2022    Procedure: COLONOSCOPY;  Surgeon: William Luna MD;  Location: [facility] ENDOSCOPY;  Service: General;  Laterality: N/A;  NORMAL COLON    FOOT SURGERY Right 2020    HYSTERECTOMY  1999    MTP JOINT FUSION Left 7/26/2024    Procedure: ARTHRODESIS METATARSALPHALANGEAL JOINT - Left 1st MPJ;  Surgeon: Meir Mcneill DPM;  Location: [facility] MAIN OR;  Service: Podiatry;  Laterality: Left;    MTP JOINT FUSION Right 12/27/2024    Procedure: ARTHRODESIS METATARSALPHALANGEAL JOINT;  Surgeon: Meir cMneill DPM;  Location: [facility]  "MAIN OR;  Service: Podiatry;  Laterality: Right;     Family History   Problem Relation Age of Onset    Hypertension Father     Breast cancer Maternal Aunt      Social History     Socioeconomic History    Marital status:    Tobacco Use    Smoking status: Never    Smokeless tobacco: Never   Vaping Use    Vaping status: Never Used   Substance and Sexual Activity    Alcohol use: Never    Drug use: Never    Sexual activity: Defer     No Known Allergies  Current Outpatient Medications   Medication Sig Dispense Refill    amphetamine-dextroamphetamine (ADDERALL) 15 MG tablet Take 1 tablet by mouth 2 (Two) Times a Day.      SEMAGLUTIDE-WEIGHT MANAGEMENT SC Inject 20 Units under the skin into the appropriate area as directed. Every 15 days      HYDROcodone-acetaminophen (NORCO) 5-325 MG per tablet Take 1-2 tablets by mouth Every 4 (Four) Hours As Needed (Pain). (Patient not taking: Reported on 1/20/2025) 30 tablet 0    promethazine (PHENERGAN) 12.5 MG tablet Take 1 tablet by mouth Every 8 (Eight) Hours As Needed for Nausea or Vomiting. (Patient not taking: Reported on 1/20/2025) 30 tablet 0     No current facility-administered medications for this visit.     Review of Systems   Constitutional: Negative.    Musculoskeletal:         Right first MPJ   All other systems reviewed and are negative.      OBJECTIVE     Vitals:    01/20/25 1321   BP: 131/87   Pulse: 79   Temp: 96.1 °F (35.6 °C)   SpO2: 95%         No results found for: \"WBC\", \"RBC\", \"HGB\", \"HCT\", \"MCV\", \"MCH\", \"MCHC\", \"RDW\", \"RDWSD\", \"MPV\", \"PLT\", \"NEUTRORELPCT\", \"LYMPHORELPCT\", \"MONORELPCT\", \"EOSRELPCT\", \"BASORELPCT\", \"AUTOIGPER\", \"NEUTROABS\", \"LYMPHSABS\", \"MONOSABS\", \"EOSABS\", \"BASOSABS\", \"AUTOIGNUM\", \"NRBC\"      No results found for: \"GLUCOSE\", \"BUN\", \"CREATININE\", \"NA\", \"K\", \"CL\", \"CALCIUM\", \"PROTEINTOT\", \"ALBUMIN\", \"ALT\", \"AST\", \"ALKPHOS\", \"BILITOT\", \"GLOB\", \"AGRATIO\", \"BCR\", \"ANIONGAP\", \"EGFR\"    Patient seen in no apparent distress.      PHYSICAL EXAM:   "   Foot/Ankle Exam    GENERAL  Appearance:  appears stated age  Orientation:  AAOx3  Affect:  appropriate  Assistance:  knee scooter  Left shoe gear: casual shoe    VASCULAR     Right Foot Vascularity   Normal vascular exam    Dorsalis pedis:  2+  Posterior tibial:  2+  Skin temperature:  warm  Edema grading:  None  CFT:  < 3 seconds  Pedal hair growth:  Present  Varicosities:  none     Left Foot Vascularity   Normal vascular exam    Dorsalis pedis:  2+  Posterior tibial:  2+  Skin temperature:  warm  Edema grading:  None  CFT:  < 3 seconds  Pedal hair growth:  Present  Varicosities:  none     NEUROLOGIC     Right Foot Neurologic   Normal sensation    Light touch sensation: normal  Vibratory sensation: normal  Hot/Cold sensation: normal  Protective Sensation using [place] Monofilament:   Sites intact: 10  Sites tested: 10     Left Foot Neurologic   Normal sensation    Light touch sensation: normal  Vibratory sensation: normal  Hot/Cold sensation:  normal  Protective Sensation using [place] Monofilament:   Sites intact: 10  Sites tested: 10    MUSCLE STRENGTH     Right Foot Muscle Strength   Foot dorsiflexion:  4  Foot plantar flexion:  4  Foot inversion:  4  Foot eversion:  4     Left Foot Muscle Strength   Foot dorsiflexion:  4  Foot plantar flexion:  4  Foot inversion:  4  Foot eversion:  4    RANGE OF MOTION     Right Foot Range of Motion   Foot and ankle ROM within normal limits       Left Foot Range of Motion   Foot and ankle ROM within normal limits    1st MTP extension: 0  1st MTP flexion: 0    DERMATOLOGIC      Right Foot Dermatologic   Skin  Right foot skin is intact.      Left Foot Dermatologic   Skin  Left foot skin is intact.      Right foot additional comments: Right foot shows below-knee fiberglass cast and dressing are dry and intact without signs of breakthrough.  First ray shows sutures intact with skin edges well-coapted with no signs of dehiscence.  Healthy surgical skin edges.   No drainage present.  No edema, erythema, calor, lymphangitis, nor signs of infection seen.  Skin edges are healing well without complications.    Hallux in rectus position.    RADIOLOGY:      X-rays performed by radiologist transcription has not been performed yet.  Upon review by Dr. Mcneill, arthrodesis of the first MPJ is present well with bone staples for arthrodesis.  First ray is in rectus position.  Early trabeculation seen across arthrodesis site and first MPJ.    ASSESSMENT/PLAN     Diagnoses and all orders for this visit:    1. Hallux limitus of right foot (Primary)    2. Foot pain, right    Comprehensive lower extremity examination and evaluation was performed.    Discussed findings and treatment plan including risks, benefits, and treatment options with patient in detail. Patient agreed with treatment plan.    Medications and allergies reviewed.  Reviewed available lab values along with other pertinent labs.  These were discussed with the patient.    Extremity:  Right leg, Short Leg cast.    Patient was placed in fiberglass cast today. The patient tolerated the procedure without any complications., Neurovascular status was evaluated post cast application and was within normal limits. The cast was not causing impingement on neuro-vasculature or vital structures.    Patient is to not weight bear to the affected foot at this time.  Patient states understanding and agreement with this plan.    Patient is to monitor for recurrence and any new symptoms and to contact Dr. Mcneill's office for a follow-up appointment.      The patient states understanding and agreement with this plan.    An After Visit Summary was printed and given to the patient at discharge, including (if requested) any available informative/educational handouts regarding diagnosis, treatment, or medications. All questions were answered to patient/family satisfaction. Should symptoms fail to improve or worsen they agree to call or return to  clinic or to go to the Emergency Department. Discussed the importance of following up with any needed screening tests/labs/specialist appointments and any requested follow-up recommended by me today. Importance of maintaining follow-up discussed and patient accepts that missed appointments can delay diagnosis and potentially lead to worsening of conditions.    Return in about 3 weeks (around 2/10/2025) for Post Operative, Cast removal, X-ray needed., or sooner if acute issues arise.    This document has been electronically signed by Meir Mcneill DPM on January 20, 2025 14:11 EST

## 2025-02-04 ENCOUNTER — OFFICE VISIT (OUTPATIENT)
Dept: PODIATRY | Facility: CLINIC | Age: 55
End: 2025-02-04
Payer: COMMERCIAL

## 2025-02-04 VITALS
HEIGHT: 68 IN | DIASTOLIC BLOOD PRESSURE: 82 MMHG | SYSTOLIC BLOOD PRESSURE: 142 MMHG | BODY MASS INDEX: 29.35 KG/M2 | OXYGEN SATURATION: 97 % | HEART RATE: 82 BPM

## 2025-02-04 DIAGNOSIS — M20.5X1 HALLUX LIMITUS OF RIGHT FOOT: Primary | ICD-10-CM

## 2025-02-04 DIAGNOSIS — M79.671 FOOT PAIN, RIGHT: ICD-10-CM

## 2025-02-04 NOTE — PROGRESS NOTES
[facility] - PODIATRY    Today's Date: 02/04/25    Patient Name: Meme Dunne  MRN: [id number]  CSN: [id number]  PCP: Woody Lynch MD  Referring Provider: No ref. provider found    SUBJECTIVE     Chief Complaint   Patient presents with    Right Foot - Post-op Follow-up     HPI: Meme Dunne, a 54 y.o.female, presents to clinic.    Procedure: Arthrodesis of right first MPJ  Date: 27 December 2024    Patient states they are doing well without complications.  Patient states they are following post-op instructions.  Patient states pain is controlled.      Patient states being pleased with postoperative results.    Patient denies any fevers, chills, nausea, vomiting, shortness of breathe, nor any other constitutional signs nor symptoms.      Past Medical History:   Diagnosis Date    Great toe pain, left     Great toe pain, right     TIA (transient ischemic attack)     24 years ago, no residual     Past Surgical History:   Procedure Laterality Date    ANAL FISSURECTOMY      APPENDECTOMY      CAST APPLICATION Left 7/26/2024    Procedure: CAST APPLICATION LEG;  Surgeon: Meir Mcneill DPM;  Location: [facility] OR;  Service: Podiatry;  Laterality: Left;    CAST APPLICATION Right 12/27/2024    Procedure: CAST APPLICATION LEG;  Surgeon: Meir Mcneill DPM;  Location: [facility] MAIN OR;  Service: Podiatry;  Laterality: Right;    CHOLECYSTECTOMY      COLONOSCOPY N/A 04/11/2022    Procedure: COLONOSCOPY;  Surgeon: William Luna MD;  Location: [facility] ENDOSCOPY;  Service: General;  Laterality: N/A;  NORMAL COLON    FOOT SURGERY Right 2020    HYSTERECTOMY  1999    MTP JOINT FUSION Left 7/26/2024    Procedure: ARTHRODESIS METATARSALPHALANGEAL JOINT - Left 1st MPJ;  Surgeon: Meir Mcneill DPM;  Location: [facility] MAIN OR;  Service: Podiatry;  Laterality: Left;    MTP JOINT FUSION Right 12/27/2024    Procedure: ARTHRODESIS METATARSALPHALANGEAL JOINT;  Surgeon: Charito  "Meir Salinas DPM;  Location: [facility] OR;  Service: Podiatry;  Laterality: Right;     Family History   Problem Relation Age of Onset    Hypertension Father     Breast cancer Maternal Aunt      Social History     Socioeconomic History    Marital status:    Tobacco Use    Smoking status: Never    Smokeless tobacco: Never   Vaping Use    Vaping status: Never Used   Substance and Sexual Activity    Alcohol use: Never    Drug use: Never    Sexual activity: Defer     No Known Allergies  Current Outpatient Medications   Medication Sig Dispense Refill    amphetamine-dextroamphetamine (ADDERALL) 15 MG tablet Take 1 tablet by mouth 2 (Two) Times a Day.      HYDROcodone-acetaminophen (NORCO) 5-325 MG per tablet Take 1-2 tablets by mouth Every 4 (Four) Hours As Needed (Pain). (Patient not taking: Reported on 2/4/2025) 30 tablet 0    promethazine (PHENERGAN) 12.5 MG tablet Take 1 tablet by mouth Every 8 (Eight) Hours As Needed for Nausea or Vomiting. (Patient not taking: Reported on 12/31/2024) 30 tablet 0    SEMAGLUTIDE-WEIGHT MANAGEMENT SC Inject 20 Units under the skin into the appropriate area as directed. Every 15 days       No current facility-administered medications for this visit.     Review of Systems   Constitutional: Negative.    Musculoskeletal:         Right first MPJ   All other systems reviewed and are negative.      OBJECTIVE     Vitals:    02/04/25 1238   BP: 142/82   Pulse: 82   SpO2: 97%         No results found for: \"WBC\", \"RBC\", \"HGB\", \"HCT\", \"MCV\", \"MCH\", \"MCHC\", \"RDW\", \"RDWSD\", \"MPV\", \"PLT\", \"NEUTRORELPCT\", \"LYMPHORELPCT\", \"MONORELPCT\", \"EOSRELPCT\", \"BASORELPCT\", \"AUTOIGPER\", \"NEUTROABS\", \"LYMPHSABS\", \"MONOSABS\", \"EOSABS\", \"BASOSABS\", \"AUTOIGNUM\", \"NRBC\"      No results found for: \"GLUCOSE\", \"BUN\", \"CREATININE\", \"NA\", \"K\", \"CL\", \"CALCIUM\", \"PROTEINTOT\", \"ALBUMIN\", \"ALT\", \"AST\", \"ALKPHOS\", \"BILITOT\", \"GLOB\", \"AGRATIO\", \"BCR\", \"ANIONGAP\", \"EGFR\"    Patient seen in no apparent distress.  "     PHYSICAL EXAM:     Foot/Ankle Exam    GENERAL  Appearance:  appears stated age  Orientation:  AAOx3  Affect:  appropriate  Assistance:  knee scooter  Left shoe gear: casual shoe    VASCULAR     Right Foot Vascularity   Normal vascular exam    Dorsalis pedis:  2+  Posterior tibial:  2+  Skin temperature:  warm  Edema grading:  None  CFT:  < 3 seconds  Pedal hair growth:  Present  Varicosities:  none     Left Foot Vascularity   Normal vascular exam    Dorsalis pedis:  2+  Posterior tibial:  2+  Skin temperature:  warm  Edema grading:  None  CFT:  < 3 seconds  Pedal hair growth:  Present  Varicosities:  none     NEUROLOGIC     Right Foot Neurologic   Normal sensation    Light touch sensation: normal  Vibratory sensation: normal  Hot/Cold sensation: normal  Protective Sensation using [place] Monofilament:   Sites intact: 10  Sites tested: 10     Left Foot Neurologic   Normal sensation    Light touch sensation: normal  Vibratory sensation: normal  Hot/Cold sensation:  normal  Protective Sensation using [place] Monofilament:   Sites intact: 10  Sites tested: 10    MUSCLE STRENGTH     Right Foot Muscle Strength   Foot dorsiflexion:  4  Foot plantar flexion:  4  Foot inversion:  4  Foot eversion:  4     Left Foot Muscle Strength   Foot dorsiflexion:  4  Foot plantar flexion:  4  Foot inversion:  4  Foot eversion:  4    RANGE OF MOTION     Right Foot Range of Motion   Foot and ankle ROM within normal limits       Left Foot Range of Motion   Foot and ankle ROM within normal limits    1st MTP extension: 0  1st MTP flexion: 0    DERMATOLOGIC      Right Foot Dermatologic   Skin  Right foot skin is intact.      Left Foot Dermatologic   Skin  Left foot skin is intact.      Right foot additional comments: Right foot shows below-knee fiberglass cast and dressing are dry and intact without signs of breakthrough.  First ray shows sutures intact with skin edges well-coapted with no signs of dehiscence.  Healthy  surgical skin edges.  No drainage present.  No edema, erythema, calor, lymphangitis, nor signs of infection seen.  Skin edges are healing well without complications.  No hypertrophic skin formation seen.    Hallux remains in rectus position.    RADIOLOGY:      XR Foot 3+ View Right    Result Date: 2/4/2025  Narrative: IN-OFFICE IMAGING:  Standing, weightbearing, 3 view, AP, MO, Lateral, Right foot Indication: Postoperative Findings: Arthrodesis of the first metatarsal phalangeal joint with 2 bone anchors.  Increased trabeculation across arthrodesis site consistent with proper healing.  No osteolytic changes seen around placement of bone staples.  Healing well. Comparison: No other changes seen compared to previous views.    ASSESSMENT/PLAN     Diagnoses and all orders for this visit:    1. Hallux limitus of right foot (Primary)    2. Foot pain, right    Comprehensive lower extremity examination and evaluation was performed.    Discussed findings and treatment plan including risks, benefits, and treatment options with patient in detail. Patient agreed with treatment plan.    Medications and allergies reviewed.  Reviewed available lab values along with other pertinent labs.  These were discussed with the patient.    Patient may begin to weight bear as tolerated in supportive shoes.  No impact activities for one month.  After that time, the patient may increase activities as tolerated.  Patient states understanding and agreement with this plan.    Rice Therapy: It is important to treat any injury as soon as possible to help control swelling and increase recovery time. The recognized regimen for immediate treatment of sport injuries includes rest, ice (cold application), compression, and elevation (RICE). Remove the injured athlete from play, apply ice to the affected area, wrap or compress the injured area with an elastic bandage when appropriate, and elevate the injured area above heart level to reduce swelling.  The  patient is to not use ice for longer than 20 minutes at a time, with at least 20 minutes of no ice usage between applications.     Patient instructed use OTC analgesics with dosing per package insert as needed.      The patient states understanding and agreement with this plan.    An After Visit Summary was printed and given to the patient at discharge, including (if requested) any available informative/educational handouts regarding diagnosis, treatment, or medications. All questions were answered to patient/family satisfaction. Should symptoms fail to improve or worsen they agree to call or return to clinic or to go to the Emergency Department. Discussed the importance of following up with any needed screening tests/labs/specialist appointments and any requested follow-up recommended by me today. Importance of maintaining follow-up discussed and patient accepts that missed appointments can delay diagnosis and potentially lead to worsening of conditions.    Return in about 3 weeks (around 2/25/2025), or if symptoms worsen or fail to improve, for Post Operative, X-ray needed., or sooner if acute issues arise.    This document has been electronically signed by Meir Mcneill DPM on February 4, 2025 12:55 EST

## 2025-02-25 ENCOUNTER — OFFICE VISIT (OUTPATIENT)
Dept: PODIATRY | Facility: CLINIC | Age: 55
End: 2025-02-25
Payer: COMMERCIAL

## 2025-02-25 VITALS
SYSTOLIC BLOOD PRESSURE: 115 MMHG | WEIGHT: 193 LBS | DIASTOLIC BLOOD PRESSURE: 79 MMHG | HEIGHT: 68 IN | TEMPERATURE: 96.6 F | OXYGEN SATURATION: 96 % | HEART RATE: 75 BPM | BODY MASS INDEX: 29.25 KG/M2

## 2025-02-25 DIAGNOSIS — M79.671 FOOT PAIN, RIGHT: ICD-10-CM

## 2025-02-25 DIAGNOSIS — M20.5X1 HALLUX LIMITUS OF RIGHT FOOT: Primary | ICD-10-CM

## 2025-02-25 NOTE — PROGRESS NOTES
[facility] - PODIATRY    Today's Date: 02/25/25    Patient Name: Meme Dunne  MRN: [id number]  CSN: [id number]  PCP: Woody Lynch MD  Referring Provider: No ref. provider found    SUBJECTIVE     Chief Complaint   Patient presents with    Right Foot - Post-op Follow-up     HPI: Meme Dunne, a 54 y.o.female, presents to clinic.    Procedure: Arthrodesis of right first MPJ  Date: 27 December 2024    Patient states they are doing well without complications.  Patient states they are following post-op instructions.  Patient states pain is controlled.      Patient denies any fevers, chills, nausea, vomiting, shortness of breathe, nor any other constitutional signs nor symptoms.      Past Medical History:   Diagnosis Date    Great toe pain, left     Great toe pain, right     TIA (transient ischemic attack)     24 years ago, no residual     Past Surgical History:   Procedure Laterality Date    ANAL FISSURECTOMY      APPENDECTOMY      CAST APPLICATION Left 7/26/2024    Procedure: CAST APPLICATION LEG;  Surgeon: Meir Mcneill DPM;  Location: [facility] MAIN OR;  Service: Podiatry;  Laterality: Left;    CAST APPLICATION Right 12/27/2024    Procedure: CAST APPLICATION LEG;  Surgeon: Meir Mcneill DPM;  Location: [facility] MAIN OR;  Service: Podiatry;  Laterality: Right;    CHOLECYSTECTOMY      COLONOSCOPY N/A 04/11/2022    Procedure: COLONOSCOPY;  Surgeon: William Luna MD;  Location: [facility] ENDOSCOPY;  Service: General;  Laterality: N/A;  NORMAL COLON    FOOT SURGERY Right 2020    HYSTERECTOMY  1999    MTP JOINT FUSION Left 7/26/2024    Procedure: ARTHRODESIS METATARSALPHALANGEAL JOINT - Left 1st MPJ;  Surgeon: Meir Mcneill DPM;  Location: [facility] MAIN OR;  Service: Podiatry;  Laterality: Left;    MTP JOINT FUSION Right 12/27/2024    Procedure: ARTHRODESIS METATARSALPHALANGEAL JOINT;  Surgeon: Meir Mcneill DPM;  Location: [facility] MAIN OR;  Service: Podiatry;   "Laterality: Right;     Family History   Problem Relation Age of Onset    Hypertension Father     Breast cancer Maternal Aunt      Social History     Socioeconomic History    Marital status:    Tobacco Use    Smoking status: Never    Smokeless tobacco: Never   Vaping Use    Vaping status: Never Used   Substance and Sexual Activity    Alcohol use: Never    Drug use: Never    Sexual activity: Defer     No Known Allergies  Current Outpatient Medications   Medication Sig Dispense Refill    amphetamine-dextroamphetamine (ADDERALL) 15 MG tablet Take 1 tablet by mouth 2 (Two) Times a Day.      SEMAGLUTIDE-WEIGHT MANAGEMENT SC Inject 20 Units under the skin into the appropriate area as directed. Every 15 days      HYDROcodone-acetaminophen (NORCO) 5-325 MG per tablet Take 1-2 tablets by mouth Every 4 (Four) Hours As Needed (Pain). (Patient not taking: Reported on 1/20/2025) 30 tablet 0    promethazine (PHENERGAN) 12.5 MG tablet Take 1 tablet by mouth Every 8 (Eight) Hours As Needed for Nausea or Vomiting. (Patient not taking: Reported on 2/25/2025) 30 tablet 0     No current facility-administered medications for this visit.     Review of Systems   Constitutional: Negative.    Musculoskeletal:         Right first MPJ   All other systems reviewed and are negative.      OBJECTIVE     Vitals:    02/25/25 0821   BP: 115/79   Pulse: 75   Temp: 96.6 °F (35.9 °C)   SpO2: 96%         No results found for: \"WBC\", \"RBC\", \"HGB\", \"HCT\", \"MCV\", \"MCH\", \"MCHC\", \"RDW\", \"RDWSD\", \"MPV\", \"PLT\", \"NEUTRORELPCT\", \"LYMPHORELPCT\", \"MONORELPCT\", \"EOSRELPCT\", \"BASORELPCT\", \"AUTOIGPER\", \"NEUTROABS\", \"LYMPHSABS\", \"MONOSABS\", \"EOSABS\", \"BASOSABS\", \"AUTOIGNUM\", \"NRBC\"      No results found for: \"GLUCOSE\", \"BUN\", \"CREATININE\", \"NA\", \"K\", \"CL\", \"CALCIUM\", \"PROTEINTOT\", \"ALBUMIN\", \"ALT\", \"AST\", \"ALKPHOS\", \"BILITOT\", \"GLOB\", \"AGRATIO\", \"BCR\", \"ANIONGAP\", \"EGFR\"    Patient seen in no apparent distress.      PHYSICAL EXAM:     Foot/Ankle " Exam    GENERAL  Appearance:  appears stated age  Orientation:  AAOx3  Affect:  appropriate  Assistance:  knee scooter  Right shoe gear: casual shoe  Left shoe gear: casual shoe    VASCULAR     Right Foot Vascularity   Normal vascular exam    Dorsalis pedis:  2+  Posterior tibial:  2+  Skin temperature:  warm  Edema grading:  None  CFT:  < 3 seconds  Pedal hair growth:  Present  Varicosities:  none     Left Foot Vascularity   Normal vascular exam    Dorsalis pedis:  2+  Posterior tibial:  2+  Skin temperature:  warm  Edema grading:  None  CFT:  < 3 seconds  Pedal hair growth:  Present  Varicosities:  none     NEUROLOGIC     Right Foot Neurologic   Normal sensation    Light touch sensation: normal  Vibratory sensation: normal  Hot/Cold sensation: normal  Protective Sensation using Morton-Chanell Monofilament:   Sites intact: 10  Sites tested: 10     Left Foot Neurologic   Normal sensation    Light touch sensation: normal  Vibratory sensation: normal  Hot/Cold sensation:  normal  Protective Sensation using Morton-Chanell Monofilament:   Sites intact: 10  Sites tested: 10    MUSCLE STRENGTH     Right Foot Muscle Strength   Foot dorsiflexion:  4  Foot plantar flexion:  4  Foot inversion:  4  Foot eversion:  4     Left Foot Muscle Strength   Foot dorsiflexion:  4  Foot plantar flexion:  4  Foot inversion:  4  Foot eversion:  4    RANGE OF MOTION     Right Foot Range of Motion   Foot and ankle ROM within normal limits       Left Foot Range of Motion   Foot and ankle ROM within normal limits    1st MTP extension: 0  1st MTP flexion: 0    DERMATOLOGIC      Right Foot Dermatologic   Skin  Right foot skin is intact.      Left Foot Dermatologic   Skin  Left foot skin is intact.      Right foot additional comments: Right foot shows below-knee fiberglass cast and dressing are dry and intact without signs of breakthrough.  First ray shows sutures intact with skin edges well-coapted with no signs of dehiscence.  Healthy  surgical skin edges.  No drainage present.  No edema, erythema, calor, lymphangitis, nor signs of infection seen.  Skin edges are healing well without complications.  No hypertrophic skin formation seen.    Upon cast removal, no skin lesions seen    Hallux remains in rectus position.    RADIOLOGY:      XR Foot 3+ View Right    Result Date: 2/4/2025  Narrative: IN-OFFICE IMAGING:  Standing, weightbearing, 3 view, AP, MO, Lateral, Right foot Indication: Postoperative Findings: Arthrodesis of the first metatarsal phalangeal joint with 2 bone anchors.  Increased trabeculation across arthrodesis site consistent with proper healing.  No osteolytic changes seen around placement of bone staples.  Healing well. Comparison: No other changes seen compared to previous views.    ASSESSMENT/PLAN     Diagnoses and all orders for this visit:    1. Hallux limitus of right foot (Primary)    2. Foot pain, right    Comprehensive lower extremity examination and evaluation was performed.    Discussed findings and treatment plan including risks, benefits, and treatment options with patient in detail. Patient agreed with treatment plan.    Medications and allergies reviewed.  Reviewed available lab values along with other pertinent labs.  These were discussed with the patient.    Patient may begin to weight bear as tolerated in supportive shoes.  No impact activities for one month.  After that time, the patient may increase activities as tolerated.  Patient states understanding and agreement with this plan.    Rice Therapy: It is important to treat any injury as soon as possible to help control swelling and increase recovery time. The recognized regimen for immediate treatment of sport injuries includes rest, ice (cold application), compression, and elevation (RICE). Remove the injured athlete from play, apply ice to the affected area, wrap or compress the injured area with an elastic bandage when appropriate, and elevate the injured area  above heart level to reduce swelling.  The patient is to not use ice for longer than 20 minutes at a time, with at least 20 minutes of no ice usage between applications.     Patient instructed use OTC analgesics with dosing per package insert as needed.      Patient is discharged from the surgery.  The patient states understanding and agreement with this plan.    An After Visit Summary was printed and given to the patient at discharge, including (if requested) any available informative/educational handouts regarding diagnosis, treatment, or medications. All questions were answered to patient/family satisfaction. Should symptoms fail to improve or worsen they agree to call or return to clinic or to go to the Emergency Department. Discussed the importance of following up with any needed screening tests/labs/specialist appointments and any requested follow-up recommended by me today. Importance of maintaining follow-up discussed and patient accepts that missed appointments can delay diagnosis and potentially lead to worsening of conditions.    Return if symptoms worsen or fail to improve., or sooner if acute issues arise.    This document has been electronically signed by Meir Mcneill DPM on February 25, 2025 08:48 EST

## (undated) DEVICE — MTP SPIN GUARD™ FEMALE REAMER, 23MM: Brand: BABY GORILLA®/GORILLA® PLATING SYSTEM

## (undated) DEVICE — MTP SPIN GUARD™ MALE REAMER, 23MM: Brand: BABY GORILLA®/GORILLA® PLATING SYSTEM

## (undated) DEVICE — SOL IRRG H2O PL/BG 1000ML STRL

## (undated) DEVICE — APPL CHLORAPREP HI/LITE 26ML ORNG

## (undated) DEVICE — EXTREMITY-LF: Brand: MEDLINE INDUSTRIES, INC.

## (undated) DEVICE — GAUZE,SPONGE,4"X4",16PLY,STRL,LF,10/TRAY: Brand: MEDLINE

## (undated) DEVICE — UNDYED BRAIDED (POLYGLACTIN 910), SYNTHETIC ABSORBABLE SUTURE: Brand: COATED VICRYL

## (undated) DEVICE — INTENDED FOR TISSUE SEPARATION, AND OTHER PROCEDURES THAT REQUIRE A SHARP SURGICAL BLADE TO PUNCTURE OR CUT.: Brand: BARD-PARKER ® CARBON RIB-BACK BLADES

## (undated) DEVICE — UNDERCAST PADDING: Brand: DEROYAL

## (undated) DEVICE — SUT ETHLN 3-0 FS118IN 663H

## (undated) DEVICE — SUT VIC 4/0 P3 18IN J494G

## (undated) DEVICE — GOWN,REINF,POLY,SIRUS,BRTH SLV,XLNG/XXL: Brand: MEDLINE

## (undated) DEVICE — SYR LL TP 10ML STRL

## (undated) DEVICE — Device

## (undated) DEVICE — COLON KIT: Brand: MEDLINE INDUSTRIES, INC.

## (undated) DEVICE — DRAPE,TOWEL,LARGE,INVISISHIELD: Brand: MEDLINE

## (undated) DEVICE — SUT VIC 3/0 SH 27IN J416H

## (undated) DEVICE — GLV SURG SENSICARE PI ORTHO SZ9 LF STRL

## (undated) DEVICE — BNDG ESMARK 4IN 12FT LF STRL BLU

## (undated) DEVICE — PENCL SMOKE/EVAC MEGADYNE TELESCP 10FT

## (undated) DEVICE — HYPODERMIC SAFETY NEEDLE: Brand: MONOJECT

## (undated) DEVICE — P28, K-WIRE, 1.6 X 150 MM, SINGLE TROCAR, SMOOTH, SS
Type: IMPLANTABLE DEVICE | Site: FOOT | Status: NON-FUNCTIONAL
Brand: MULTI SYSTEM
Removed: 2024-07-26

## (undated) DEVICE — SUT MNCRYL PLS ANTIB UD 4/0 PS2 18IN

## (undated) DEVICE — GLV SURG SENSICARE ORTHO PF LF 9 STRL

## (undated) DEVICE — Device: Brand: DEFENDO AIR/WATER/SUCTION AND BIOPSY VALVE

## (undated) DEVICE — ANTIBACTERIAL UNDYED BRAIDED (POLYGLACTIN 910), SYNTHETIC ABSORBABLE SUTURE: Brand: COATED VICRYL

## (undated) DEVICE — COVER,C-ARM,41X74: Brand: MEDLINE